# Patient Record
Sex: MALE | Race: WHITE | Employment: FULL TIME | URBAN - METROPOLITAN AREA
[De-identification: names, ages, dates, MRNs, and addresses within clinical notes are randomized per-mention and may not be internally consistent; named-entity substitution may affect disease eponyms.]

---

## 2019-03-06 ENCOUNTER — OFFICE VISIT (OUTPATIENT)
Dept: INTERNAL MEDICINE CLINIC | Age: 59
End: 2019-03-06

## 2019-03-06 ENCOUNTER — TELEPHONE (OUTPATIENT)
Dept: INTERNAL MEDICINE CLINIC | Age: 59
End: 2019-03-06

## 2019-03-06 VITALS
HEIGHT: 68 IN | DIASTOLIC BLOOD PRESSURE: 100 MMHG | SYSTOLIC BLOOD PRESSURE: 162 MMHG | RESPIRATION RATE: 12 BRPM | OXYGEN SATURATION: 98 % | BODY MASS INDEX: 36.07 KG/M2 | TEMPERATURE: 98.5 F | WEIGHT: 238 LBS | HEART RATE: 74 BPM

## 2019-03-06 DIAGNOSIS — N52.01 ERECTILE DYSFUNCTION DUE TO ARTERIAL INSUFFICIENCY: ICD-10-CM

## 2019-03-06 DIAGNOSIS — I10 PRIMARY HYPERTENSION: ICD-10-CM

## 2019-03-06 DIAGNOSIS — G47.33 OBSTRUCTIVE SLEEP APNEA: ICD-10-CM

## 2019-03-06 DIAGNOSIS — R63.5 WEIGHT GAIN: ICD-10-CM

## 2019-03-06 DIAGNOSIS — Z00.00 ROUTINE GENERAL MEDICAL EXAMINATION AT A HEALTH CARE FACILITY: Primary | ICD-10-CM

## 2019-03-06 DIAGNOSIS — E66.01 SEVERE OBESITY (HCC): ICD-10-CM

## 2019-03-06 DIAGNOSIS — F51.01 PRIMARY INSOMNIA: ICD-10-CM

## 2019-03-06 DIAGNOSIS — Z12.5 PROSTATE CANCER SCREENING: ICD-10-CM

## 2019-03-06 RX ORDER — PHENOL/SODIUM PHENOLATE
20 AEROSOL, SPRAY (ML) MUCOUS MEMBRANE DAILY
COMMUNITY
End: 2020-07-27 | Stop reason: DRUGHIGH

## 2019-03-06 RX ORDER — HYDROCHLOROTHIAZIDE 25 MG/1
25 TABLET ORAL DAILY
Qty: 90 TAB | Refills: 3 | Status: SHIPPED | OUTPATIENT
Start: 2019-03-06 | End: 2019-05-15 | Stop reason: SDUPTHER

## 2019-03-06 RX ORDER — IBUPROFEN 400 MG/1
TABLET ORAL
COMMUNITY
End: 2022-02-16

## 2019-03-06 NOTE — PROGRESS NOTES
Iraj Liao,born 1960, is a 64 y.o. male, who is seen today for new patient evaluation. He says his blood pressures been high for a couple of years but his doctor retired 2 years ago he has not seen a doctor since then. He also is obese and he does not think his weight is changed a whole lot may be down 5 pounds in the last year. He has arthritis and uses Motrin. He has obstructive sleep apnea and his mask is not working properly but he still uses it but would like to see . Had a colonoscopy 3-5 years ago which was normal but he cannot remember who did it or where it was done. He works 2 jobs and does not sleep as well as he should. He does take cat naps in between jobs. He sleeps perhaps 6 hours a day altogether. He has erectile dysfunction but does not have a relationship right now so he does not need Viagra right now. He thinks 1 of his testicles a little softer than the other and he would like that checked. He uses omeprazole and is having no reflux symptoms. Past Medical History:   Diagnosis Date    Basal cell carcinoma     being treated.  Gastrointestinal disorder     Hypertension     Sleep apnea      Past Surgical History:   Procedure Laterality Date    HX TYMPANOSTOMY       Current Outpatient Medications   Medication Sig Dispense Refill    Omeprazole delayed release (PRILOSEC D/R) 20 mg tablet Take 20 mg by mouth daily.  ibuprofen (MOTRIN) 400 mg tablet Take  by mouth every six (6) hours as needed for Pain. Visit Vitals  BP (!) 162/100 (BP 1 Location: Left arm, BP Patient Position: Sitting)   Pulse 74   Temp 98.5 °F (36.9 °C) (Oral)   Resp 12   Ht 5' 8\" (1.727 m)   Wt 238 lb (108 kg)   SpO2 98%   BMI 36.19 kg/m²     Carotids are 2+ without bruits. Lungs are clear to percussion. Good breath sounds with no wheezing or crackles. Heart reveals a regular rhythm with normal S1 and S2 no murmur gallop click or rub. Apical impulse is not palpable.   Abdomen is obese soft nontender with no obvious hepatosplenomegaly or masses and no bruits. Extremities reveal no clubbing cyanosis or edema. Pulses are 2+. He is uncircumcised, testicles feel normal.    Assessment: #1. Hypertension. We will start hydrochlorthiazide 25 mg daily and no added salt diet. #2.  Severe obesity, he is going to work much harder on weight loss going forward. #3.  Obstructive sleep apnea, will refer to sleep medicine at his request.  #4.  Erectile dysfunction, he does not need Viagra right now since he is not having a relationship right now. #5. Insomnia, working 2 jobs, but asked him to try to get as much sleep as he can preferably up to 7 hours/day even if it is broken up somewhat. #6.  Family history diabetes, he is hoping he is not diabetic. He has been told he is borderline in the past.  We will check fasting glucose soon. We will check fasting lab including lipids and chemistries and also check PSA in the very near future. Follow-up in about 2 months to see how he is doing his blood pressure and see how he is doing with weight control. Robb Oneal MD FACP    Please note: This document has been produced using voice recognition software. Unrecognized errors in transcription may be present.

## 2019-03-06 NOTE — TELEPHONE ENCOUNTER
Patient stating you were calling in BP meds but I don't see anything. He wants that send to [facility].

## 2019-03-09 LAB
A-G RATIO,AGRAT: 1.7 RATIO (ref 1.1–2.6)
ABSOLUTE LYMPHOCYTE COUNT, 10803: 2.5 K/UL (ref 1–4.8)
ALBUMIN SERPL-MCNC: 4.7 G/DL (ref 3.5–5)
ALP SERPL-CCNC: 79 U/L (ref 25–115)
ALT SERPL-CCNC: 24 U/L (ref 5–40)
ANION GAP SERPL CALC-SCNC: 13.9 MMOL/L
AST SERPL W P-5'-P-CCNC: 17 U/L (ref 10–37)
BASOPHILS # BLD: 0.1 K/UL (ref 0–0.2)
BASOPHILS NFR BLD: 0 % (ref 0–2)
BILIRUB SERPL-MCNC: 0.5 MG/DL (ref 0.2–1.2)
BUN SERPL-MCNC: 10 MG/DL (ref 6–22)
CALCIUM SERPL-MCNC: 9.6 MG/DL (ref 8.4–10.4)
CHLORIDE SERPL-SCNC: 104 MMOL/L (ref 98–110)
CHOLEST SERPL-MCNC: 209 MG/DL (ref 110–200)
CO2 SERPL-SCNC: 28 MMOL/L (ref 20–32)
CREAT SERPL-MCNC: 0.7 MG/DL (ref 0.5–1.2)
EOSINOPHIL # BLD: 0.3 K/UL (ref 0–0.5)
EOSINOPHIL NFR BLD: 3 % (ref 0–6)
ERYTHROCYTE [DISTWIDTH] IN BLOOD BY AUTOMATED COUNT: 15.6 % (ref 10–15.5)
GFRAA, 66117: >60
GFRNA, 66118: >60
GLOBULIN,GLOB: 2.8 G/DL (ref 2–4)
GLUCOSE SERPL-MCNC: 131 MG/DL (ref 70–99)
GRANULOCYTES,GRANS: 67 % (ref 40–75)
HCT VFR BLD AUTO: 50.4 % (ref 39.3–51.6)
HDLC SERPL-MCNC: 37 MG/DL (ref 40–59)
HDLC SERPL-MCNC: 5.6 MG/DL (ref 0–5)
HGB BLD-MCNC: 16.2 G/DL (ref 13.1–17.2)
LDLC SERPL CALC-MCNC: 145 MG/DL (ref 50–99)
LYMPHOCYTES, LYMLT: 23 % (ref 20–45)
MCH RBC QN AUTO: 31 PG (ref 26–34)
MCHC RBC AUTO-ENTMCNC: 32 G/DL (ref 31–36)
MCV RBC AUTO: 95 FL (ref 80–95)
MONOCYTES # BLD: 0.8 K/UL (ref 0.1–1)
MONOCYTES NFR BLD: 7 % (ref 3–12)
NEUTROPHILS # BLD AUTO: 7.5 K/UL (ref 1.8–7.7)
PLATELET # BLD AUTO: 184 K/UL (ref 140–440)
PMV BLD AUTO: 9.4 FL (ref 9–13)
POTASSIUM SERPL-SCNC: 4.3 MMOL/L (ref 3.5–5.5)
PROT SERPL-MCNC: 7.5 G/DL (ref 6.4–8.3)
PSA SERPL-MCNC: 0.66 NG/ML
RBC # BLD AUTO: 5.3 M/UL (ref 3.8–5.8)
SODIUM SERPL-SCNC: 146 MMOL/L (ref 133–145)
T4 FREE SERPL-MCNC: 1.2 NG/DL (ref 0.9–1.8)
TRIGL SERPL-MCNC: 134 MG/DL (ref 40–149)
TSH SERPL DL<=0.005 MIU/L-ACNC: 0.86 MCU/ML (ref 0.27–4.2)
VLDLC SERPL CALC-MCNC: 27 MG/DL (ref 8–30)
WBC # BLD AUTO: 11.2 K/UL (ref 4–11)

## 2019-03-11 ENCOUNTER — TELEPHONE (OUTPATIENT)
Dept: INTERNAL MEDICINE CLINIC | Age: 59
End: 2019-03-11

## 2019-03-11 NOTE — TELEPHONE ENCOUNTER
----- Message from Kira Rojas MD sent at 3/11/2019 10:04 AM EDT -----  Please notify the patient that his lab work looks good.

## 2019-05-10 ENCOUNTER — OFFICE VISIT (OUTPATIENT)
Dept: INTERNAL MEDICINE CLINIC | Age: 59
End: 2019-05-10

## 2019-05-10 VITALS
HEART RATE: 81 BPM | OXYGEN SATURATION: 98 % | WEIGHT: 224.2 LBS | TEMPERATURE: 98.1 F | DIASTOLIC BLOOD PRESSURE: 76 MMHG | HEIGHT: 68 IN | BODY MASS INDEX: 33.98 KG/M2 | SYSTOLIC BLOOD PRESSURE: 138 MMHG | RESPIRATION RATE: 18 BRPM

## 2019-05-10 DIAGNOSIS — G47.33 OBSTRUCTIVE SLEEP APNEA: ICD-10-CM

## 2019-05-10 DIAGNOSIS — I10 PRIMARY HYPERTENSION: Primary | ICD-10-CM

## 2019-05-10 DIAGNOSIS — E66.9 OBESITY (BMI 30-39.9): ICD-10-CM

## 2019-05-10 DIAGNOSIS — R73.01 IMPAIRED FASTING GLUCOSE: ICD-10-CM

## 2019-05-10 PROBLEM — E66.01 SEVERE OBESITY (HCC): Status: RESOLVED | Noted: 2019-03-06 | Resolved: 2019-05-10

## 2019-05-10 NOTE — PATIENT INSTRUCTIONS

## 2019-05-10 NOTE — PROGRESS NOTES
1. Have you been to the ER, urgent care clinic or hospitalized since your last visit? NO.     2. Have you seen or consulted any other health care providers outside of the [address] [place] [address] since your last visit (Include any pap smears or colon screening)? YES      Do you have an Advanced Directive? NO    Would you like information on Advanced Directives?  NO

## 2019-05-10 NOTE — PROGRESS NOTES
Rich Liao,born 1960, is a 64 y.o. male, who is seen today for reevaluation of hypertension severe obesity sleep apnea GERD DJD. He is feeling well he has some surgery for skin cancer in the nose and it turned out to be fairly extensive, a plastic surgeon worked on him and he will be having a little more soon. He has bandaged. Is been working on his diet and has lost 14 pounds in 2 months been on hydrochlorothiazide for 2 months with no side effects. DJD is doing well. Uses CPAP. He is having no reflux as he continues omeprazole. Past Medical History:   Diagnosis Date    Basal cell carcinoma     being treated.  Gastrointestinal disorder     GERD (gastroesophageal reflux disease)     Hypertension     Sleep apnea      Current Outpatient Medications   Medication Sig Dispense Refill    Omeprazole delayed release (PRILOSEC D/R) 20 mg tablet Take 20 mg by mouth daily.  ibuprofen (MOTRIN) 400 mg tablet Take  by mouth every six (6) hours as needed for Pain.  hydroCHLOROthiazide (HYDRODIURIL) 25 mg tablet Take 1 Tab by mouth daily. 90 Tab 3     Visit Vitals  /76 (BP 1 Location: Left arm, BP Patient Position: Sitting)   Pulse 81   Temp 98.1 °F (36.7 °C) (Oral)   Resp 18   Ht 5' 8\" (1.727 m)   Wt 224 lb 3.2 oz (101.7 kg)   SpO2 98%   BMI 34.09 kg/m²     Visit Vitals  /76 (BP 1 Location: Left arm, BP Patient Position: Sitting)   Pulse 81   Temp 98.1 °F (36.7 °C) (Oral)   Resp 18   Ht 5' 8\" (1.727 m)   Wt 224 lb 3.2 oz (101.7 kg)   SpO2 98%   BMI 34.09 kg/m²     Assessment: #1. Hypertension now adequately controlled. He will continue hydrochlorthiazide 25 mg daily and no added salt diet. #2.  Obesity down 14 pounds, he is now obese but not severely obese anymore. He will work to continue losing weight, he is pretty sure he can do that. #3.  Recent plastic surgery for skin cancer on the nose, follow-up with his plastic surgeon, Dr. Toni Franklin.   #4.  GERD asymptomatic, he will continue omeprazole 20 mg daily. #5.  Obstructive sleep apnea doing well, he will continue CPAP. #5.  Impaired fasting glucose, I told him that if he had another glucose this high he would officially be diabetic but so far he has not and since he is losing weight rapidly we will check glucose again until early next year. #6. He was told to use vitamin D supplements every day, he did buy that but that has not used any, recommended by his prior physician. I recommended that he not bother with vitamin D, he gets enough sunlight he is middle-aged male is at low risk for complications of low vitamin D if he should have it. Follow-up 4 months, no lab for nearly a year. This visit lasted 25 minutes, greater than 50% of the time spent counseling. Robb Lujan MD FACP    Please note: This document has been produced using voice recognition software. Unrecognized errors in transcription may be present.

## 2019-05-15 RX ORDER — HYDROCHLOROTHIAZIDE 25 MG/1
25 TABLET ORAL DAILY
Qty: 90 TAB | Refills: 3 | Status: SHIPPED | OUTPATIENT
Start: 2019-05-15 | End: 2020-06-21

## 2019-05-21 RX ORDER — SILDENAFIL 50 MG/1
50 TABLET, FILM COATED ORAL
COMMUNITY
Start: 2010-11-29 | End: 2019-09-13 | Stop reason: DRUGHIGH

## 2019-05-21 RX ORDER — MOMETASONE FUROATE 50 UG/1
SPRAY, METERED NASAL
Refills: 11 | COMMUNITY
Start: 2019-03-25 | End: 2021-09-03

## 2019-05-22 ENCOUNTER — ANESTHESIA EVENT (OUTPATIENT)
Dept: SURGERY | Age: 59
End: 2019-05-22
Payer: COMMERCIAL

## 2019-05-23 ENCOUNTER — ANESTHESIA (OUTPATIENT)
Dept: SURGERY | Age: 59
End: 2019-05-23
Payer: COMMERCIAL

## 2019-05-23 ENCOUNTER — HOSPITAL ENCOUNTER (OUTPATIENT)
Age: 59
Setting detail: OUTPATIENT SURGERY
Discharge: HOME | End: 2019-05-23
Attending: OTOLARYNGOLOGY | Admitting: OTOLARYNGOLOGY
Payer: COMMERCIAL

## 2019-05-23 VITALS
TEMPERATURE: 98 F | RESPIRATION RATE: 17 BRPM | SYSTOLIC BLOOD PRESSURE: 138 MMHG | OXYGEN SATURATION: 99 % | DIASTOLIC BLOOD PRESSURE: 80 MMHG | HEART RATE: 63 BPM | WEIGHT: 222 LBS | HEIGHT: 68 IN | BODY MASS INDEX: 33.65 KG/M2

## 2019-05-23 LAB
ATRIAL RATE: 61 BPM
BUN BLD-MCNC: 14 MG/DL (ref 7–18)
CALCULATED P AXIS, ECG09: 40 DEGREES
CALCULATED R AXIS, ECG10: 20 DEGREES
CALCULATED T AXIS, ECG11: 43 DEGREES
CHLORIDE BLD-SCNC: 101 MMOL/L (ref 100–108)
DIAGNOSIS, 93000: NORMAL
GLUCOSE BLD STRIP.AUTO-MCNC: 125 MG/DL (ref 74–106)
HCT VFR BLD CALC: 39 % (ref 36–49)
HGB BLD-MCNC: 13.3 G/DL (ref 12–16)
P-R INTERVAL, ECG05: 168 MS
POTASSIUM BLD-SCNC: 3.4 MMOL/L (ref 3.5–5.5)
Q-T INTERVAL, ECG07: 434 MS
QRS DURATION, ECG06: 90 MS
QTC CALCULATION (BEZET), ECG08: 436 MS
SODIUM BLD-SCNC: 142 MMOL/L (ref 136–145)
VENTRICULAR RATE, ECG03: 61 BPM

## 2019-05-23 PROCEDURE — 77030002986 HC SUT PROL J&J -A: Performed by: OTOLARYNGOLOGY

## 2019-05-23 PROCEDURE — 76010000149 HC OR TIME 1 TO 1.5 HR: Performed by: OTOLARYNGOLOGY

## 2019-05-23 PROCEDURE — 74011000272 HC RX REV CODE- 272: Performed by: OTOLARYNGOLOGY

## 2019-05-23 PROCEDURE — 77030018846 HC SOL IRR STRL H20 ICUM -A: Performed by: OTOLARYNGOLOGY

## 2019-05-23 PROCEDURE — 74011250637 HC RX REV CODE- 250/637: Performed by: NURSE ANESTHETIST, CERTIFIED REGISTERED

## 2019-05-23 PROCEDURE — 74011250636 HC RX REV CODE- 250/636: Performed by: OTOLARYNGOLOGY

## 2019-05-23 PROCEDURE — 77030020263 HC SOL INJ SOD CL0.9% LFCR 1000ML: Performed by: OTOLARYNGOLOGY

## 2019-05-23 PROCEDURE — 74011250636 HC RX REV CODE- 250/636

## 2019-05-23 PROCEDURE — 93005 ELECTROCARDIOGRAM TRACING: CPT

## 2019-05-23 PROCEDURE — 76210000021 HC REC RM PH II 0.5 TO 1 HR: Performed by: OTOLARYNGOLOGY

## 2019-05-23 PROCEDURE — 74011000250 HC RX REV CODE- 250: Performed by: OTOLARYNGOLOGY

## 2019-05-23 PROCEDURE — 74011250636 HC RX REV CODE- 250/636: Performed by: NURSE ANESTHETIST, CERTIFIED REGISTERED

## 2019-05-23 PROCEDURE — 77030002933 HC SUT MCRYL J&J -A: Performed by: OTOLARYNGOLOGY

## 2019-05-23 PROCEDURE — 76060000033 HC ANESTHESIA 1 TO 1.5 HR: Performed by: OTOLARYNGOLOGY

## 2019-05-23 PROCEDURE — 77030018836 HC SOL IRR NACL ICUM -A: Performed by: OTOLARYNGOLOGY

## 2019-05-23 PROCEDURE — 77030031139 HC SUT VCRL2 J&J -A: Performed by: OTOLARYNGOLOGY

## 2019-05-23 PROCEDURE — 82947 ASSAY GLUCOSE BLOOD QUANT: CPT

## 2019-05-23 RX ORDER — LIDOCAINE HYDROCHLORIDE 20 MG/ML
INJECTION, SOLUTION EPIDURAL; INFILTRATION; INTRACAUDAL; PERINEURAL AS NEEDED
Status: DISCONTINUED | OUTPATIENT
Start: 2019-05-23 | End: 2019-05-23 | Stop reason: HOSPADM

## 2019-05-23 RX ORDER — CEPHALEXIN 250 MG/1
500 CAPSULE ORAL 4 TIMES DAILY
Qty: 28 CAP | Refills: 0 | Status: SHIPPED | OUTPATIENT
Start: 2019-05-23 | End: 2020-07-27 | Stop reason: ALTCHOICE

## 2019-05-23 RX ORDER — SODIUM CHLORIDE, SODIUM LACTATE, POTASSIUM CHLORIDE, CALCIUM CHLORIDE 600; 310; 30; 20 MG/100ML; MG/100ML; MG/100ML; MG/100ML
25 INJECTION, SOLUTION INTRAVENOUS CONTINUOUS
Status: DISCONTINUED | OUTPATIENT
Start: 2019-05-23 | End: 2019-05-23 | Stop reason: HOSPADM

## 2019-05-23 RX ORDER — BACITRACIN 500 [USP'U]/G
OINTMENT TOPICAL AS NEEDED
Status: DISCONTINUED | OUTPATIENT
Start: 2019-05-23 | End: 2019-05-23 | Stop reason: HOSPADM

## 2019-05-23 RX ORDER — MIDAZOLAM HYDROCHLORIDE 1 MG/ML
INJECTION, SOLUTION INTRAMUSCULAR; INTRAVENOUS AS NEEDED
Status: DISCONTINUED | OUTPATIENT
Start: 2019-05-23 | End: 2019-05-23 | Stop reason: HOSPADM

## 2019-05-23 RX ORDER — FAMOTIDINE 20 MG/1
20 TABLET, FILM COATED ORAL ONCE
Status: COMPLETED | OUTPATIENT
Start: 2019-05-23 | End: 2019-05-23

## 2019-05-23 RX ORDER — PROPOFOL 10 MG/ML
INJECTION, EMULSION INTRAVENOUS AS NEEDED
Status: DISCONTINUED | OUTPATIENT
Start: 2019-05-23 | End: 2019-05-23 | Stop reason: HOSPADM

## 2019-05-23 RX ORDER — CEFAZOLIN SODIUM 2 G/50ML
2 SOLUTION INTRAVENOUS
Status: COMPLETED | OUTPATIENT
Start: 2019-05-23 | End: 2019-05-23

## 2019-05-23 RX ORDER — LIDOCAINE HYDROCHLORIDE AND EPINEPHRINE 10; 10 MG/ML; UG/ML
INJECTION, SOLUTION INFILTRATION; PERINEURAL AS NEEDED
Status: DISCONTINUED | OUTPATIENT
Start: 2019-05-23 | End: 2019-05-23 | Stop reason: HOSPADM

## 2019-05-23 RX ORDER — FENTANYL CITRATE 50 UG/ML
INJECTION, SOLUTION INTRAMUSCULAR; INTRAVENOUS AS NEEDED
Status: DISCONTINUED | OUTPATIENT
Start: 2019-05-23 | End: 2019-05-23 | Stop reason: HOSPADM

## 2019-05-23 RX ADMIN — CEFAZOLIN SODIUM 2 G: 2 SOLUTION INTRAVENOUS at 08:38

## 2019-05-23 RX ADMIN — LIDOCAINE HYDROCHLORIDE 20 MG: 20 INJECTION, SOLUTION EPIDURAL; INFILTRATION; INTRACAUDAL; PERINEURAL at 08:36

## 2019-05-23 RX ADMIN — MIDAZOLAM HYDROCHLORIDE 2 MG: 1 INJECTION, SOLUTION INTRAMUSCULAR; INTRAVENOUS at 08:25

## 2019-05-23 RX ADMIN — FENTANYL CITRATE 50 MCG: 50 INJECTION, SOLUTION INTRAMUSCULAR; INTRAVENOUS at 08:36

## 2019-05-23 RX ADMIN — FENTANYL CITRATE 50 MCG: 50 INJECTION, SOLUTION INTRAMUSCULAR; INTRAVENOUS at 08:52

## 2019-05-23 RX ADMIN — FAMOTIDINE 20 MG: 20 TABLET ORAL at 06:48

## 2019-05-23 RX ADMIN — SODIUM CHLORIDE, SODIUM LACTATE, POTASSIUM CHLORIDE, AND CALCIUM CHLORIDE: 600; 310; 30; 20 INJECTION, SOLUTION INTRAVENOUS at 08:29

## 2019-05-23 RX ADMIN — PROPOFOL 40 MG: 10 INJECTION, EMULSION INTRAVENOUS at 08:36

## 2019-05-23 NOTE — H&P
History and Physical    Patient: Wild Sommer MRN: [id number]  SSN: [id number]    YOB: 1960  Age: 64 y.o. Sex: male      Subjective:      Wild Sommer is a 64 y.o. male who underwent left paramedian forehead flap reconstruction right nasal ala Mohs  defect 4/26/19. Presents now for release of flap pedicle. .     Past Medical History:   Diagnosis Date    Basal cell carcinoma     being treated.  Gastrointestinal disorder     GERD (gastroesophageal reflux disease)     Hypertension     Sleep apnea     uses CPAP     Past Surgical History:   Procedure Laterality Date    HX HEENT      Ear Tubes    HX LYMPHADENECTOMY      left axilla    HX MOHS PROCEDURES      nose    HX OTHER SURGICAL      Melanoma removed on back       History reviewed. No pertinent family history. Social History     Tobacco Use    Smoking status: Never Smoker    Smokeless tobacco: Never Used   Substance Use Topics    Alcohol use: Yes     Comment: occasional      Prior to Admission medications    Medication Sig Start Date End Date Taking? Authorizing Provider   mometasone (NASONEX) 50 mcg/actuation nasal spray SHAKE LQ AND U 2 SPRAYS IEN QD 3/25/19  Yes Provider, Historical   hydroCHLOROthiazide (HYDRODIURIL) 25 mg tablet Take 1 Tab by mouth daily. 5/15/19  Yes Rudy Rodrigues MD   sildenafil citrate (VIAGRA) 50 mg tablet Take 50 mg by mouth. 11/29/10   Provider, Historical   Omeprazole delayed release (PRILOSEC D/R) 20 mg tablet Take 20 mg by mouth daily. Provider, Historical   ibuprofen (MOTRIN) 400 mg tablet Take  by mouth every six (6) hours as needed for Pain.     Provider, Historical        No Known Allergies        Objective:     Vitals:    05/21/19 1540 05/23/19 0631   BP:  136/67   Pulse:  64   Resp:  18   Temp:  98.6 °F (37 °C)   SpO2:  100%   Weight: 99.8 kg (220 lb) 100.7 kg (222 lb)   Height: 5' 8\" (1.727 m) 5' 8\" (1.727 m)        Physical Exam:  Nicely healed forehead  Flap  RRR  Lungs clear    Assessment:   S/p forehead flap recon right nasal ala  Hospital Problems  Date Reviewed: 5/10/2019    None          Plan:     Release of pedicle    Signed By: Maxim Domingo MD     May 23, 2019

## 2019-05-23 NOTE — DISCHARGE INSTRUCTIONS
Clean suture lines on nose and brow with 1/2 strength peroxide 3 times per day, then apply Bacitracin ointment each time    Apply OTC vitamin E oil to forehead scar 2 times per day with massage    Elevate head of bed for 4 nights on extra pillow    Okay to shower      DISCHARGE SUMMARY from Nurse    PATIENT INSTRUCTIONS:    After general anesthesia or intravenous sedation, for 24 hours or while taking prescription Narcotics:  · Limit your activities  · Do not drive and operate hazardous machinery  · Do not make important personal or business decisions  · Do  not drink alcoholic beverages  · If you have not urinated within 8 hours after discharge, please contact your surgeon on call. Report the following to your surgeon:  · Excessive pain, swelling, redness or odor of or around the surgical area  · Temperature over 100.5  · Nausea and vomiting lasting longer than 4 hours or if unable to take medications  · Any signs of decreased circulation or nerve impairment to extremity: change in color, persistent  numbness, tingling, coldness or increase pain  · Any questions    What to do at Home:  These are general instructions for a healthy lifestyle:    No smoking/ No tobacco products/ Avoid exposure to second hand smoke  Surgeon General's Warning:  Quitting smoking now greatly reduces serious risk to your health. Obesity, smoking, and sedentary lifestyle greatly increases your risk for illness    A healthy diet, regular physical exercise & weight monitoring are important for maintaining a healthy lifestyle    You may be retaining fluid if you have a history of heart failure or if you experience any of the following symptoms:  Weight gain of 3 pounds or more overnight or 5 pounds in a week, increased swelling in our hands or feet or shortness of breath while lying flat in bed. Please call your doctor as soon as you notice any of these symptoms; do not wait until your next office visit.     Recognize signs and symptoms of STROKE:    F-face looks uneven    A-arms unable to move or move unevenly    S-speech slurred or non-existent    T-time-call 911 as soon as signs and symptoms begin-DO NOT go       Back to bed or wait to see if you get better-TIME IS BRAIN. Warning Signs of HEART ATTACK     Call 911 if you have these symptoms:   Chest discomfort. Most heart attacks involve discomfort in the center of the chest that lasts more than a few minutes, or that goes away and comes back. It can feel like uncomfortable pressure, squeezing, fullness, or pain.  Discomfort in other areas of the upper body. Symptoms can include pain or discomfort in one or both arms, the back, neck, jaw, or stomach.  Shortness of breath with or without chest discomfort.  Other signs may include breaking out in a cold sweat, nausea, or lightheadedness. Don't wait more than five minutes to call 911 - MINUTES MATTER! Fast action can save your life. Calling 911 is almost always the fastest way to get lifesaving treatment. Emergency Medical Services staff can begin treatment when they arrive -- up to an hour sooner than if someone gets to the hospital by car. The discharge information has been reviewed with the patient. The patient verbalized understanding. Discharge medications reviewed with the patient and appropriate educational materials and side effects teaching were provided. ___________________________________________________________________________________________________________________________________  Patient armband removed and given to patient to take home.   Patient was informed of the privacy risks if armband lost or stolen

## 2019-05-23 NOTE — PROGRESS NOTES
conducted a pre-surgery visit with Candance Salinas, who is a 64 y. o.,male. The  provided the following Interventions:  Initiated a relationship of care and support. Offered prayer and assurance of continued prayers on patient's behalf. Plan:  Chaplains will continue to follow and will provide pastoral care on an as needed/requested basis.  recommends bedside caregivers page  on duty if patient shows signs of acute spiritual or emotional distress.     [facility]   Staff [address] [facility]   [phone]

## 2019-05-23 NOTE — OP NOTES
[facility]  OPERATIVE REPORT    Name:  Gisela Rainey  MR#:   [id number]  :  1960  ACCOUNT #:  [de-identified]  DATE OF SERVICE:  2019      PREOPERATIVE DIAGNOSES:  Status post forehead flap reconstruction, large right nasal ala/nasal alar groove defect status post Mohs excision basal cell carcinoma, as above. POSTOPERATIVE DIAGNOSIS:  Status post forehead flap reconstruction, large right nasal ala/nasal alar groove defect status post Mohs excision basal cell carcinoma, as above. PROCEDURE PERFORMED:  Release of forehead flap pedicle with adjacent tissue transfer (CPT 57002). SURGEON:  Josh Nath MD    ASSISTANT:  None. ANESTHESIA:  Local, sedation. COMPLICATIONS:  None. SPECIMENS REMOVED:  None    IMPLANTS:  None. ESTIMATED BLOOD LOSS:  Minimal.    BRIEF HISTORY:  The patient is a 82-year-old male with a long history of sun exposure in the past, who is recently diagnosed with a large basal cell carcinoma of the right nasal ala. He underwent an extensive Mohs resection per Dr Guerrero Late on 2019, and was left with a large defect with the right nasal ala and adjacent alar-facial groove. He was re-taken to the operating room on 2019 for reconstruction with a left paramedian myocutaneous forehead flap. The flap has healed nicely; he presents now for release of his forehead flap pedicle with adjacent tissue transfer. PROCEDURE:  The patient was placed on the operating table in the supine position and was given intravenous sedation, the operative sites were injected with 1% lidocaine with 1:100,000 epinephrine. The patient was then prepped and draped in the usual sterile fashion, and was given Ancef 2 g IV.     After allowing adequate time for vasoconstriction, #15 blade was used to amputate the proximal portion of the pedicle at the left medial brow and the distal portion of the pedicle just superior to the inside end of the nasal defect - the pedicle was discarded. Next, #15 blade was used to excise excess skin and subcutaneous tissue from the left medial brow donor site. Hemostasis was achieved with bipolar electrocautery. The left medial brow donor site was then meticulously closed in a curvilinear complex plastic fashion - 4-0 Monocryl was used in an interrupted inverted fashion to reapproximate the subcutaneous tissue, and 6-0 Prolene was used in a running locked fashion to reapproximate the skin edges. Redundant soft tissue at the inferior aspect of the closure was excised with a #15 blade prior to suturing. Next, the superior half of the nasal ala/alar-facial groove flap was elevated with a #15 blade. Excess subcutaneous soft tissue was meticulously debrided from the undersurface of the flap. Care was taken to maintain sufficient soft tissue on the flap to maintain viability. Next, #15 blade was used to excise a strip of soft tissue at the superior aspect of the defect. Additional soft tissue was excised from the bed of the defect to better accommodate the flap. Hemostasis was achieved with bipolar electrocautery. Next, the flap was re-draped over the defect, and excess skin and subcutaneous tissue was excised from the superior edge of the flap. The flap was then transferred into the defect and was meticulously sutured in place in layers with 4-0 Monocryl and 6-0 Prolene. The flap laid in beautifully. The suture lines were clean, bacitracin ointment was applied. The procedure was terminated - the patient was taken to the recovery room in stable condition. He tolerated the procedure well, there were no complications.   Estimated blood loss was minimal.        Fabian Marino MD      [id number]  D:  05/23/2019 9:50  T:  05/23/2019 13:30  JOB #:  [id number]  CC:  Ronn Goodell, MD Sandria Curling, MD

## 2019-05-23 NOTE — ANESTHESIA POSTPROCEDURE EVALUATION
Procedure(s):  Release of Forehead flap pedicle.     general    Anesthesia Post Evaluation      Multimodal analgesia: multimodal analgesia used between 6 hours prior to anesthesia start to PACU discharge  Patient location during evaluation: bedside  Patient participation: complete - patient participated  Level of consciousness: awake  Pain management: adequate  Airway patency: patent  Anesthetic complications: no  Cardiovascular status: acceptable  Respiratory status: acceptable  Hydration status: acceptable  Post anesthesia nausea and vomiting:  controlled      Vitals Value Taken Time   /80 5/23/2019  9:39 AM   Temp 36.7 °C (98 °F) 5/23/2019  9:38 AM   Pulse 63 5/23/2019  9:39 AM   Resp 17 5/23/2019  9:38 AM   SpO2 99 % 5/23/2019  9:39 AM

## 2019-05-23 NOTE — PERIOP NOTES
Pre-Op Summary    Pt arrived via car with family/friend and is oriented to time, place, person and situation. Patient with steady gait with none assistive devices. Visit Vitals  /67   Pulse 64   Temp 98.6 °F (37 °C)   Resp 18   Ht 5' 8\" (1.727 m)   Wt 100.7 kg (222 lb)   SpO2 100%   BMI 33.75 kg/m²       Peripheral IV located on Left hand . Patients belongings are located in [facility]. Patient's point of contact is friend Pat Lloyd and their contact number is: [phone]. They will be leaving and coming back. They are able to receive medication information. They will be their ride home.

## 2019-05-23 NOTE — BRIEF OP NOTE
BRIEF OPERATIVE NOTE    Date of Procedure: 5/23/2019   Preoperative Diagnosis: C44.3111 Basal Cell Carcinoma of nose, s/p forehead flap reconstruction  Postoperative Diagnosis: Same  Procedure(s):  Release of Forehead flap pedicle with adjacent tissue transfer  Surgeon(s) and Role:     * Tricia Lock MD - Primary         Surgical Assistant: None    Surgical Staff:  Circ-1: Ce aBngura RN  Scrub Tech-1: Shivam Lin  Surg Asst-1: Hernando Rush  Event Time In Time Out   Incision Start 3791    Incision Close 0926      Anesthesia: MAC   Estimated Blood Loss: Minimal  Specimens: None   Findings: Nicely healed flap   Complications: None  Implants: None

## 2019-05-23 NOTE — ANESTHESIA PREPROCEDURE EVALUATION
Relevant Problems   No relevant active problems       Anesthetic History   No history of anesthetic complications            Review of Systems / Medical History  Patient summary reviewed, nursing notes reviewed and pertinent labs reviewed    Pulmonary  Within defined limits      Sleep apnea           Neuro/Psych   Within defined limits           Cardiovascular    Hypertension              Exercise tolerance: >4 METS  Comments: Stress test noted (2006)   GI/Hepatic/Renal     GERD           Endo/Other        Morbid obesity     Other Findings   Comments: Basal cell carcinoma          Physical Exam    Airway  Mallampati: III  TM Distance: < 4 cm  Neck ROM: normal range of motion   Mouth opening: Normal     Cardiovascular    Rhythm: regular  Rate: normal         Dental    Dentition: Poor dentition     Pulmonary  Breath sounds clear to auscultation               Abdominal  GI exam deferred       Other Findings            Anesthetic Plan    ASA: 3  Anesthesia type: MAC and general - backup          Induction: Intravenous  Anesthetic plan and risks discussed with: Patient

## 2019-09-13 ENCOUNTER — OFFICE VISIT (OUTPATIENT)
Dept: INTERNAL MEDICINE CLINIC | Age: 59
End: 2019-09-13

## 2019-09-13 VITALS
BODY MASS INDEX: 35.01 KG/M2 | OXYGEN SATURATION: 96 % | SYSTOLIC BLOOD PRESSURE: 130 MMHG | HEIGHT: 68 IN | DIASTOLIC BLOOD PRESSURE: 76 MMHG | RESPIRATION RATE: 14 BRPM | TEMPERATURE: 98.9 F | HEART RATE: 73 BPM | WEIGHT: 231 LBS

## 2019-09-13 DIAGNOSIS — I10 PRIMARY HYPERTENSION: Primary | ICD-10-CM

## 2019-09-13 DIAGNOSIS — R73.01 IMPAIRED FASTING GLUCOSE: ICD-10-CM

## 2019-09-13 DIAGNOSIS — E66.9 OBESITY (BMI 30-39.9): ICD-10-CM

## 2019-09-13 DIAGNOSIS — Z00.00 ROUTINE GENERAL MEDICAL EXAMINATION AT A HEALTH CARE FACILITY: ICD-10-CM

## 2019-09-13 DIAGNOSIS — N52.01 ERECTILE DYSFUNCTION DUE TO ARTERIAL INSUFFICIENCY: ICD-10-CM

## 2019-09-13 RX ORDER — SILDENAFIL 100 MG/1
TABLET, FILM COATED ORAL
Qty: 5 TAB | Refills: 11 | Status: SHIPPED | OUTPATIENT
Start: 2019-09-13 | End: 2019-12-06 | Stop reason: SDUPTHER

## 2019-09-13 NOTE — PROGRESS NOTES
Sarai Liao,born 1960, is a 64 y.o. male, who is seen today for reevaluation of hypertension obesity impaired fasting glucose. He has been on and off his diet but overall not losing weight over the last several months. He feels well. He sometimes forgets to take hydrochlorthiazide. He notes that he is not in a relationship but he has erectile dysfunction and he is to be on Viagra without benefit, samples, possibly 50 mg. He would like to try something to help him in that way. Past Medical History:   Diagnosis Date    Basal cell carcinoma     being treated.  Gastrointestinal disorder     GERD (gastroesophageal reflux disease)     Hypertension     Sleep apnea     uses CPAP     Current Outpatient Medications   Medication Sig Dispense Refill    sildenafil citrate (VIAGRA) 100 mg tablet 1 tablet by mouth at least 1 hour before intercourse 5 Tab 11    mometasone (NASONEX) 50 mcg/actuation nasal spray SHAKE LQ AND U 2 SPRAYS IEN QD  11    hydroCHLOROthiazide (HYDRODIURIL) 25 mg tablet Take 1 Tab by mouth daily. 90 Tab 3    Omeprazole delayed release (PRILOSEC D/R) 20 mg tablet Take 20 mg by mouth daily.  ibuprofen (MOTRIN) 400 mg tablet Take  by mouth every six (6) hours as needed for Pain.  cephALEXin (KEFLEX) 250 mg capsule Take 2 Caps by mouth four (4) times daily. Indications: S/p flap surgery 28 Cap 0     Visit Vitals  /76   Pulse 73   Temp 98.9 °F (37.2 °C) (Oral)   Resp 14   Ht 5' 8\" (1.727 m)   Wt 231 lb (104.8 kg)   SpO2 96%   BMI 35.12 kg/m²     Carotids are 2+ without bruits. Lungs are clear to percussion. Good breath sounds with no wheezing or crackles. Heart reveals a regular rhythm with normal S1 and S2 no murmur gallop click or rub. Apical impulse is not palpable. Abdomen is soft and nontender with no hepatospleno megaly or masses and no bruits. Extremities reveal no clubbing cyanosis or edema. Assessment: #1.   Hypertension controlled, he will continue Hydrocort thiazide 25 mg daily. #2.  Obesity, I told him he must try to lose some weight, is borderline for diabetes and may actually be diabetic, we will check glucose and A1c in 6 months. #3.  Erectile dysfunction, 100 mg of Viagra to be used at least 1 hour before intercourse. Follow-up 6 months for physical    Cljoann Williamson. Jennifer Sheffield MD FACP    Please note: This document has been produced using voice recognition software. Unrecognized errors in transcription may be present.

## 2019-09-13 NOTE — PROGRESS NOTES
Ron Giraldo presents today for   Chief Complaint   Patient presents with    Hypertension     4 month follow up                              room 10              Depression Screening:  3 most recent PHQ Screens 3/6/2019   Little interest or pleasure in doing things Several days   Feeling down, depressed, irritable, or hopeless Several days   Total Score PHQ 2 2       Learning Assessment:  No flowsheet data found. Abuse Screening:  No flowsheet data found. Fall Risk  No flowsheet data found. Coordination of Care:  1. Have you been to the ER, urgent care clinic since your last visit? Hospitalized since your last visit? no    2. Have you seen or consulted any other health care providers outside of the [address] [place] [address] since your last visit? Include any pap smears or colon screening.  no

## 2019-12-06 RX ORDER — SILDENAFIL 100 MG/1
TABLET, FILM COATED ORAL
Qty: 5 TAB | Refills: 11 | Status: SHIPPED | OUTPATIENT
Start: 2019-12-06 | End: 2020-07-27 | Stop reason: SDUPTHER

## 2019-12-06 NOTE — TELEPHONE ENCOUNTER
Last Visit: 9/13/19 with MD Isabel Adame  Next Appointment: 3/20/20 with MD Isabel Adame    Requested Prescriptions     Pending Prescriptions Disp Refills    sildenafil citrate (VIAGRA) 100 mg tablet 5 Tab 11     Sig: Take 1 tablet by mouth at least 1 hour before intercourse

## 2020-03-18 ENCOUNTER — TELEPHONE (OUTPATIENT)
Dept: INTERNAL MEDICINE CLINIC | Age: 60
End: 2020-03-18

## 2020-07-16 ENCOUNTER — TELEPHONE (OUTPATIENT)
Dept: INTERNAL MEDICINE CLINIC | Age: 60
End: 2020-07-16

## 2020-07-16 DIAGNOSIS — R63.5 WEIGHT GAIN: ICD-10-CM

## 2020-07-16 DIAGNOSIS — R73.01 IMPAIRED FASTING GLUCOSE: ICD-10-CM

## 2020-07-16 DIAGNOSIS — Z12.5 PROSTATE CANCER SCREENING: ICD-10-CM

## 2020-07-16 DIAGNOSIS — Z00.00 ROUTINE GENERAL MEDICAL EXAMINATION AT A HEALTH CARE FACILITY: ICD-10-CM

## 2020-07-16 DIAGNOSIS — I10 PRIMARY HYPERTENSION: Primary | ICD-10-CM

## 2020-07-17 ENCOUNTER — HOSPITAL ENCOUNTER (OUTPATIENT)
Dept: LAB | Age: 60
Discharge: HOME | End: 2020-07-17
Payer: COMMERCIAL

## 2020-07-17 ENCOUNTER — APPOINTMENT (OUTPATIENT)
Dept: INTERNAL MEDICINE CLINIC | Age: 60
End: 2020-07-17

## 2020-07-17 DIAGNOSIS — I10 PRIMARY HYPERTENSION: ICD-10-CM

## 2020-07-17 DIAGNOSIS — Z00.00 ROUTINE GENERAL MEDICAL EXAMINATION AT A HEALTH CARE FACILITY: ICD-10-CM

## 2020-07-17 DIAGNOSIS — R63.5 WEIGHT GAIN: ICD-10-CM

## 2020-07-17 DIAGNOSIS — R73.01 IMPAIRED FASTING GLUCOSE: ICD-10-CM

## 2020-07-17 DIAGNOSIS — Z12.5 PROSTATE CANCER SCREENING: ICD-10-CM

## 2020-07-17 LAB
ALBUMIN SERPL-MCNC: 4.5 G/DL (ref 3.4–5)
ALBUMIN/GLOB SERPL: 1.5 {RATIO} (ref 0.8–1.7)
ALP SERPL-CCNC: 61 U/L (ref 45–117)
ALT SERPL-CCNC: 24 U/L (ref 16–61)
ANION GAP SERPL CALC-SCNC: 9 MMOL/L (ref 3–18)
AST SERPL-CCNC: 11 U/L (ref 10–38)
BASOPHILS # BLD: 0 K/UL (ref 0–0.1)
BASOPHILS NFR BLD: 0 % (ref 0–2)
BILIRUB SERPL-MCNC: 0.6 MG/DL (ref 0.2–1)
BUN SERPL-MCNC: 12 MG/DL (ref 7–18)
BUN/CREAT SERPL: 15 (ref 12–20)
CALCIUM SERPL-MCNC: 9.3 MG/DL (ref 8.5–10.1)
CHLORIDE SERPL-SCNC: 104 MMOL/L (ref 100–111)
CHOLEST SERPL-MCNC: 220 MG/DL
CO2 SERPL-SCNC: 27 MMOL/L (ref 21–32)
CREAT SERPL-MCNC: 0.82 MG/DL (ref 0.6–1.3)
DIFFERENTIAL METHOD BLD: NORMAL
EOSINOPHIL # BLD: 0.2 K/UL (ref 0–0.4)
EOSINOPHIL NFR BLD: 2 % (ref 0–5)
ERYTHROCYTE [DISTWIDTH] IN BLOOD BY AUTOMATED COUNT: 13.1 % (ref 11.6–14.5)
EST. AVERAGE GLUCOSE BLD GHB EST-MCNC: 123 MG/DL
GLOBULIN SER CALC-MCNC: 3 G/DL (ref 2–4)
GLUCOSE SERPL-MCNC: 91 MG/DL (ref 74–99)
HBA1C MFR BLD: 5.9 % (ref 4.2–5.6)
HCT VFR BLD AUTO: 43.9 % (ref 36–48)
HDLC SERPL-MCNC: 43 MG/DL (ref 40–60)
HDLC SERPL: 5.1 {RATIO} (ref 0–5)
HGB BLD-MCNC: 15.5 G/DL (ref 13–16)
LDLC SERPL CALC-MCNC: 152.2 MG/DL (ref 0–100)
LIPID PROFILE,FLP: ABNORMAL
LYMPHOCYTES # BLD: 2.8 K/UL (ref 0.9–3.6)
LYMPHOCYTES NFR BLD: 28 % (ref 21–52)
MCH RBC QN AUTO: 30.2 PG (ref 24–34)
MCHC RBC AUTO-ENTMCNC: 35.3 G/DL (ref 31–37)
MCV RBC AUTO: 85.4 FL (ref 74–97)
MONOCYTES # BLD: 0.7 K/UL (ref 0.05–1.2)
MONOCYTES NFR BLD: 7 % (ref 3–10)
NEUTS SEG # BLD: 6.3 K/UL (ref 1.8–8)
NEUTS SEG NFR BLD: 63 % (ref 40–73)
PLATELET # BLD AUTO: 280 K/UL (ref 135–420)
PMV BLD AUTO: 10.6 FL (ref 9.2–11.8)
POTASSIUM SERPL-SCNC: 3.6 MMOL/L (ref 3.5–5.5)
PROT SERPL-MCNC: 7.5 G/DL (ref 6.4–8.2)
PSA SERPL-MCNC: 0.8 NG/ML (ref 0–4)
RBC # BLD AUTO: 5.14 M/UL (ref 4.7–5.5)
SODIUM SERPL-SCNC: 140 MMOL/L (ref 136–145)
T4 FREE SERPL-MCNC: 1.2 NG/DL (ref 0.7–1.5)
TRIGL SERPL-MCNC: 124 MG/DL (ref ?–150)
TSH SERPL DL<=0.05 MIU/L-ACNC: 1.74 UIU/ML (ref 0.36–3.74)
VLDLC SERPL CALC-MCNC: 24.8 MG/DL
WBC # BLD AUTO: 10 K/UL (ref 4.6–13.2)

## 2020-07-17 PROCEDURE — 84153 ASSAY OF PSA TOTAL: CPT

## 2020-07-17 PROCEDURE — 36415 COLL VENOUS BLD VENIPUNCTURE: CPT

## 2020-07-17 PROCEDURE — 84443 ASSAY THYROID STIM HORMONE: CPT

## 2020-07-17 PROCEDURE — 83036 HEMOGLOBIN GLYCOSYLATED A1C: CPT

## 2020-07-17 PROCEDURE — 85025 COMPLETE CBC W/AUTO DIFF WBC: CPT

## 2020-07-17 PROCEDURE — 80061 LIPID PANEL: CPT

## 2020-07-17 PROCEDURE — 84439 ASSAY OF FREE THYROXINE: CPT

## 2020-07-17 PROCEDURE — 80053 COMPREHEN METABOLIC PANEL: CPT

## 2020-07-27 ENCOUNTER — TELEPHONE (OUTPATIENT)
Dept: INTERNAL MEDICINE CLINIC | Age: 60
End: 2020-07-27

## 2020-07-27 ENCOUNTER — OFFICE VISIT (OUTPATIENT)
Dept: INTERNAL MEDICINE CLINIC | Age: 60
End: 2020-07-27

## 2020-07-27 VITALS
DIASTOLIC BLOOD PRESSURE: 74 MMHG | BODY MASS INDEX: 35.01 KG/M2 | RESPIRATION RATE: 12 BRPM | SYSTOLIC BLOOD PRESSURE: 134 MMHG | HEIGHT: 68 IN | HEART RATE: 72 BPM | WEIGHT: 231 LBS | TEMPERATURE: 97.6 F

## 2020-07-27 DIAGNOSIS — Z00.00 ROUTINE GENERAL MEDICAL EXAMINATION AT A HEALTH CARE FACILITY: Primary | ICD-10-CM

## 2020-07-27 DIAGNOSIS — I10 PRIMARY HYPERTENSION: ICD-10-CM

## 2020-07-27 DIAGNOSIS — R63.5 WEIGHT GAIN: ICD-10-CM

## 2020-07-27 DIAGNOSIS — N52.01 ERECTILE DYSFUNCTION DUE TO ARTERIAL INSUFFICIENCY: ICD-10-CM

## 2020-07-27 DIAGNOSIS — R73.01 IMPAIRED FASTING GLUCOSE: ICD-10-CM

## 2020-07-27 DIAGNOSIS — E66.9 OBESITY (BMI 30-39.9): ICD-10-CM

## 2020-07-27 RX ORDER — HYDROCHLOROTHIAZIDE 25 MG/1
TABLET ORAL
Qty: 90 TAB | Refills: 3 | Status: SHIPPED | OUTPATIENT
Start: 2020-07-27 | End: 2021-09-03 | Stop reason: SDUPTHER

## 2020-07-27 RX ORDER — OMEPRAZOLE 40 MG/1
40 CAPSULE, DELAYED RELEASE ORAL DAILY
Qty: 90 CAP | Refills: 3 | Status: SHIPPED | OUTPATIENT
Start: 2020-07-27 | End: 2021-09-03 | Stop reason: SDUPTHER

## 2020-07-27 RX ORDER — SILDENAFIL 100 MG/1
TABLET, FILM COATED ORAL
Qty: 30 TAB | Refills: 5 | Status: SHIPPED | OUTPATIENT
Start: 2020-07-27

## 2020-07-27 NOTE — PROGRESS NOTES
Raquel Liao,born 1960, is a 64 y.o. male, who is seen today for a routine physical exam and follow-up on reflux impaired fasting glucose hypertension erectile dysfunction and obesity. He tries to diet off-and-on but not all the time and his weight is unchanged from a year ago. He uses sildenafil for erectile dysfunction and sometimes it helps and occasionally it does not. He occasionally has reflux even with using omeprazole, once or twice a week typically. His fasting glucose was slightly elevated when I saw him little over a year ago. He does take his blood pressure medicine every day. Past Medical History:   Diagnosis Date    Basal cell carcinoma     being treated.  Gastrointestinal disorder     GERD (gastroesophageal reflux disease)     Hypertension     Sleep apnea     uses CPAP     Past Surgical History:   Procedure Laterality Date    HX HEENT      Ear Tubes    HX LYMPHADENECTOMY      left axilla    HX MOHS PROCEDURES      nose    HX OTHER SURGICAL      Melanoma removed on back      Current Outpatient Medications   Medication Sig Dispense Refill    sildenafil citrate (VIAGRA) 100 mg tablet 1 tablet by mouth at least 1 hour before intercourse 30 Tab 5    hydroCHLOROthiazide (HYDRODIURIL) 25 mg tablet TAKE 1 TABLET BY MOUTH  DAILY 90 Tab 3    omeprazole (PRILOSEC) 40 mg capsule Take 1 Cap by mouth daily. 90 Cap 3    mometasone (NASONEX) 50 mcg/actuation nasal spray SHAKE LQ AND U 2 SPRAYS IEN QD  11    ibuprofen (MOTRIN) 400 mg tablet Take  by mouth every six (6) hours as needed for Pain.        No Known Allergies  Social History     Socioeconomic History    Marital status:      Spouse name: Not on file    Number of children: Not on file    Years of education: Not on file    Highest education level: Not on file   Tobacco Use    Smoking status: Never Smoker    Smokeless tobacco: Never Used   Substance and Sexual Activity    Alcohol use: Yes     Comment: occasional  Drug use: No    Sexual activity: Not Currently     Visit Vitals  /74   Pulse 72   Temp 97.6 °F (36.4 °C) (Temporal)   Resp 12   Ht 5' 8\" (1.727 m)   Wt 231 lb (104.8 kg)   BMI 35.12 kg/m²     Neck reveals no adenopathy or thyromegaly. Carotids are 2+ without bruits. Lungs are clear to percussion. Good breath sounds with no wheezing or crackles. Heart reveals a regular rhythm with normal S1 and S2 no murmur gallop click or rub. Apical impulse is not palpable. Abdomen is obese soft nontender with no obvious hepatosplenomegaly or masses. No bruits. Extremities reveal no clubbing cyanosis or edema. Pulses are 2+. Results for orders placed or performed during the hospital encounter of 07/17/20   CBC WITH AUTOMATED DIFF   Result Value Ref Range    WBC 10.0 4.6 - 13.2 K/uL    RBC 5.14 4.70 - 5.50 M/uL    HGB 15.5 13.0 - 16.0 g/dL    HCT 43.9 36.0 - 48.0 %    MCV 85.4 74.0 - 97.0 FL    MCH 30.2 24.0 - 34.0 PG    MCHC 35.3 31.0 - 37.0 g/dL    RDW 13.1 11.6 - 14.5 %    PLATELET 219 067 - 950 K/uL    MPV 10.6 9.2 - 11.8 FL    NEUTROPHILS 63 40 - 73 %    LYMPHOCYTES 28 21 - 52 %    MONOCYTES 7 3 - 10 %    EOSINOPHILS 2 0 - 5 %    BASOPHILS 0 0 - 2 %    ABS. NEUTROPHILS 6.3 1.8 - 8.0 K/UL    ABS. LYMPHOCYTES 2.8 0.9 - 3.6 K/UL    ABS. MONOCYTES 0.7 0.05 - 1.2 K/UL    ABS. EOSINOPHILS 0.2 0.0 - 0.4 K/UL    ABS.  BASOPHILS 0.0 0.0 - 0.1 K/UL    DF AUTOMATED     LIPID PANEL   Result Value Ref Range    LIPID PROFILE          Cholesterol, total 220 (H) <200 MG/DL    Triglyceride 124 <150 MG/DL    HDL Cholesterol 43 40 - 60 MG/DL    LDL, calculated 152.2 (H) 0 - 100 MG/DL    VLDL, calculated 24.8 MG/DL    CHOL/HDL Ratio 5.1 (H) 0 - 5.0     METABOLIC PANEL, COMPREHENSIVE   Result Value Ref Range    Sodium 140 136 - 145 mmol/L    Potassium 3.6 3.5 - 5.5 mmol/L    Chloride 104 100 - 111 mmol/L    CO2 27 21 - 32 mmol/L    Anion gap 9 3.0 - 18 mmol/L    Glucose 91 74 - 99 mg/dL    BUN 12 7.0 - 18 MG/DL    Creatinine 0. 82 0.6 - 1.3 MG/DL    BUN/Creatinine ratio 15 12 - 20      GFR est AA >60 >60 ml/min/1.73m2    GFR est non-AA >60 >60 ml/min/1.73m2    Calcium 9.3 8.5 - 10.1 MG/DL    Bilirubin, total 0.6 0.2 - 1.0 MG/DL    ALT (SGPT) 24 16 - 61 U/L    AST (SGOT) 11 10 - 38 U/L    Alk. phosphatase 61 45 - 117 U/L    Protein, total 7.5 6.4 - 8.2 g/dL    Albumin 4.5 3.4 - 5.0 g/dL    Globulin 3.0 2.0 - 4.0 g/dL    A-G Ratio 1.5 0.8 - 1.7     T4, FREE   Result Value Ref Range    T4, Free 1.2 0.7 - 1.5 NG/DL   TSH 3RD GENERATION   Result Value Ref Range    TSH 1.74 0.36 - 3.74 uIU/mL   PSA SCREENING (SCREENING)   Result Value Ref Range    Prostate Specific Ag 0.8 0.0 - 4.0 ng/mL   HEMOGLOBIN A1C WITH EAG   Result Value Ref Range    Hemoglobin A1c 5.9 (H) 4.2 - 5.6 %    Est. average glucose 123 mg/dL     Assessment: 1. Hypertension is controlled, he will continue hydrochlorothiazide 25 mg daily. #2.  Impaired fasting glucose little over a year ago now glucose is normal but hemoglobin A1c is slightly above normal.  I Told him this is consistent with impaired fasting glucose and he needs to do the best he can to work on weight loss including avoiding sweets in his diet and cutting down on white starches and eating more vegetables and salads in their place. #3.  Obesity unchanged from a year ago, encouraged him to work on weight loss as above particularly in view of impaired fasting glucose. #4.  Erectile dysfunction with 100 mg of sildenafil usually helping him but not always. I have changed the dosage from the 20 mg size to the 100 mg size and he will fill that at his local pharmacy with a coupon. #5.  Reflux not totally controlled with Meprazole 20 mg, we will increase to the 40 mg size. Follow-up in 1 year for a physical with Dr. Dipesh eL. Robb Yoon MD FACP    Please note: This document has been produced using voice recognition software. Unrecognized errors in transcription may be present. Maximino Bose

## 2021-08-11 ENCOUNTER — TELEPHONE (OUTPATIENT)
Dept: INTERNAL MEDICINE CLINIC | Age: 61
End: 2021-08-11

## 2021-08-11 DIAGNOSIS — I10 PRIMARY HYPERTENSION: Primary | ICD-10-CM

## 2021-08-11 DIAGNOSIS — R73.09 ELEVATED HEMOGLOBIN A1C: ICD-10-CM

## 2021-08-11 DIAGNOSIS — E78.5 DYSLIPIDEMIA: ICD-10-CM

## 2021-08-26 ENCOUNTER — HOSPITAL ENCOUNTER (OUTPATIENT)
Dept: LAB | Age: 61
Discharge: HOME | End: 2021-08-26
Payer: COMMERCIAL

## 2021-08-26 DIAGNOSIS — R73.09 ELEVATED HEMOGLOBIN A1C: ICD-10-CM

## 2021-08-26 DIAGNOSIS — E78.5 DYSLIPIDEMIA: ICD-10-CM

## 2021-08-26 DIAGNOSIS — I10 PRIMARY HYPERTENSION: ICD-10-CM

## 2021-08-26 LAB
ALBUMIN SERPL-MCNC: 4.1 G/DL (ref 3.4–5)
ALBUMIN/GLOB SERPL: 1.2 {RATIO} (ref 0.8–1.7)
ALP SERPL-CCNC: 85 U/L (ref 45–117)
ALT SERPL-CCNC: 31 U/L (ref 16–61)
ANION GAP SERPL CALC-SCNC: 6 MMOL/L (ref 3–18)
AST SERPL-CCNC: 13 U/L (ref 10–38)
BILIRUB SERPL-MCNC: 0.6 MG/DL (ref 0.2–1)
BUN SERPL-MCNC: 10 MG/DL (ref 7–18)
BUN/CREAT SERPL: 13 (ref 12–20)
CALCIUM SERPL-MCNC: 9.4 MG/DL (ref 8.5–10.1)
CHLORIDE SERPL-SCNC: 100 MMOL/L (ref 100–111)
CHOLEST SERPL-MCNC: 237 MG/DL
CO2 SERPL-SCNC: 31 MMOL/L (ref 21–32)
CREAT SERPL-MCNC: 0.77 MG/DL (ref 0.6–1.3)
EST. AVERAGE GLUCOSE BLD GHB EST-MCNC: 266 MG/DL
GLOBULIN SER CALC-MCNC: 3.3 G/DL (ref 2–4)
GLUCOSE SERPL-MCNC: 244 MG/DL (ref 74–99)
HBA1C MFR BLD: 10.9 % (ref 4.2–5.6)
HDLC SERPL-MCNC: 45 MG/DL (ref 40–60)
HDLC SERPL: 5.3 {RATIO} (ref 0–5)
LDLC SERPL CALC-MCNC: 150.8 MG/DL (ref 0–100)
LIPID PROFILE,FLP: ABNORMAL
POTASSIUM SERPL-SCNC: 3.6 MMOL/L (ref 3.5–5.5)
PROT SERPL-MCNC: 7.4 G/DL (ref 6.4–8.2)
SODIUM SERPL-SCNC: 137 MMOL/L (ref 136–145)
TRIGL SERPL-MCNC: 206 MG/DL (ref ?–150)
VLDLC SERPL CALC-MCNC: 41.2 MG/DL

## 2021-08-26 PROCEDURE — 80061 LIPID PANEL: CPT

## 2021-08-26 PROCEDURE — 36415 COLL VENOUS BLD VENIPUNCTURE: CPT

## 2021-08-26 PROCEDURE — 83036 HEMOGLOBIN GLYCOSYLATED A1C: CPT

## 2021-08-26 PROCEDURE — 80053 COMPREHEN METABOLIC PANEL: CPT

## 2021-08-26 NOTE — PROGRESS NOTES
New patient appointment 9/2/20211. A1c 10.9.  1 year ago A1c was 5.9  2.  ; LDL 150D.   Kidneys and liver within normal limits

## 2021-09-02 ENCOUNTER — TELEPHONE (OUTPATIENT)
Dept: INTERNAL MEDICINE CLINIC | Age: 61
End: 2021-09-02

## 2021-09-02 ENCOUNTER — OFFICE VISIT (OUTPATIENT)
Dept: INTERNAL MEDICINE CLINIC | Age: 61
End: 2021-09-02
Payer: COMMERCIAL

## 2021-09-02 VITALS
BODY MASS INDEX: 34.13 KG/M2 | OXYGEN SATURATION: 98 % | TEMPERATURE: 97.2 F | SYSTOLIC BLOOD PRESSURE: 119 MMHG | HEIGHT: 68 IN | WEIGHT: 225.2 LBS | RESPIRATION RATE: 16 BRPM | DIASTOLIC BLOOD PRESSURE: 72 MMHG | HEART RATE: 84 BPM

## 2021-09-02 DIAGNOSIS — E66.9 OBESITY (BMI 30-39.9): ICD-10-CM

## 2021-09-02 DIAGNOSIS — G62.9 NEUROPATHY: ICD-10-CM

## 2021-09-02 DIAGNOSIS — G47.33 OBSTRUCTIVE SLEEP APNEA: ICD-10-CM

## 2021-09-02 DIAGNOSIS — E78.2 MIXED HYPERLIPIDEMIA: ICD-10-CM

## 2021-09-02 DIAGNOSIS — Z76.89 ENCOUNTER TO ESTABLISH CARE WITH NEW DOCTOR: Primary | ICD-10-CM

## 2021-09-02 DIAGNOSIS — I10 PRIMARY HYPERTENSION: ICD-10-CM

## 2021-09-02 DIAGNOSIS — E11.65 TYPE 2 DIABETES MELLITUS WITH HYPERGLYCEMIA, WITHOUT LONG-TERM CURRENT USE OF INSULIN (HCC): ICD-10-CM

## 2021-09-02 DIAGNOSIS — K21.9 GASTROESOPHAGEAL REFLUX DISEASE WITHOUT ESOPHAGITIS: ICD-10-CM

## 2021-09-02 PROCEDURE — 99215 OFFICE O/P EST HI 40 MIN: CPT | Performed by: NURSE PRACTITIONER

## 2021-09-02 RX ORDER — ATORVASTATIN CALCIUM 20 MG/1
20 TABLET, FILM COATED ORAL DAILY
Qty: 90 TABLET | Refills: 1 | Status: SHIPPED | OUTPATIENT
Start: 2021-09-02 | End: 2022-02-16 | Stop reason: SDUPTHER

## 2021-09-02 RX ORDER — METFORMIN HYDROCHLORIDE 500 MG/1
500 TABLET ORAL 2 TIMES DAILY WITH MEALS
Qty: 180 TABLET | Refills: 0 | Status: SHIPPED | OUTPATIENT
Start: 2021-09-02 | End: 2021-11-03

## 2021-09-02 NOTE — PROGRESS NOTES
Chief Complaint   Patient presents with   [facility] Establish Care       1. Have you been to the ER, urgent care clinic since your last visit? Hospitalized since your last visit? No    2. Have you seen or consulted any other health care providers outside of the [address] [place] [address] since your last visit? Include any pap smears or colon screening.  No

## 2021-09-02 NOTE — PROGRESS NOTES
[facility] [id number] [facility]   [place], [address]  [phone] [facility]/[phone] fax    9/2/2021    HPI:   Saniya Roman 1960 is a pleasant WHITE/NON- male who presents today to establish care and for routine physical exam.  He is single. 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 Todays concern:  He has been experiencing tingling in his feet intermittently along with numbness in his heels. This has been going on for some time now. He does not have issues with ambulation. He denies previous back surgeries or trauma. GERD: Continues omeprazole as needed for symptoms. He does avoid foods that may irritate his gut. Hypertension: BP well controlled with current medication regimen of hydrochlorothiazide. He denies chest pain shortness of breath fatigue edema. Obesity: He admits to lacking with exercise. At one time he was exercising on a regular basis and omitting carbs and sweets from his diet. Since the Covid pandemic he has not been exercising and his diet has returned back to being unhealthy. Past Medical History:   Diagnosis Date    Basal cell carcinoma     being treated.  Gastrointestinal disorder     GERD (gastroesophageal reflux disease)     Hypertension     Sleep apnea     uses CPAP     Past Surgical History:   Procedure Laterality Date    HX HEENT      Ear Tubes    HX LYMPHADENECTOMY      left axilla    HX MOHS PROCEDURES      nose    HX OTHER SURGICAL      Melanoma removed on back      Current Outpatient Medications   Medication Sig    hydroCHLOROthiazide (HYDRODIURIL) 25 mg tablet TAKE 1 TABLET BY MOUTH  DAILY    omeprazole (PRILOSEC) 40 mg capsule Take 1 Capsule by mouth daily as needed for Other (Reflux).  metFORMIN (GLUCOPHAGE) 500 mg tablet Take 1 Tablet by mouth two (2) times daily (with meals). For diabetes    atorvastatin (LIPITOR) 20 mg tablet Take 1 Tablet by mouth daily.  Indications: high cholesterol and high triglycerides    sildenafil citrate (VIAGRA) 100 mg tablet 1 tablet by mouth at least 1 hour before intercourse    ibuprofen (MOTRIN) 400 mg tablet Take  by mouth every six (6) hours as needed for Pain. No current facility-administered medications for this visit. Allergies and Intolerances:   No Known Allergies  Family History:   No family history on file. Social History:   He  reports that he has never smoked. He has never used smokeless tobacco.   Social History     Substance and Sexual Activity   Alcohol Use Yes    Comment: occasional     Immunization History:  Immunization History   Administered Date(s) Administered    Covid-19, MODERNA, Mrna, Lnp-s, Pf, 100mcg/0.5mL 03/31/2021, 04/28/2021       Review of Systems:   As above included in HPI. Otherwise 11 point review of systems negative including constitutional, skin, HENT, eyes, respiratory, cardiovascular, gastrointestinal, genitourinary, musculoskeletal, endocrine, hematologic, allergy, and neurologic. Physical:   Visit Vitals  /72   Pulse 84   Temp 97.2 °F (36.2 °C) (Temporal)   Resp 16   Ht 5' 8\" (1.727 m)   Wt 225 lb 3.2 oz (102.2 kg)   SpO2 98%   BMI 34.24 kg/m²      Wt Readings from Last 3 Encounters:   09/02/21 225 lb 3.2 oz (102.2 kg)   07/27/20 231 lb (104.8 kg)   09/13/19 231 lb (104.8 kg)         Exam:   Physical Exam  Vitals and nursing note reviewed. Constitutional:       Appearance: Normal appearance. He is obese. HENT:      Head: Normocephalic and atraumatic. Right Ear: External ear normal.      Left Ear: External ear normal.   Eyes:      Extraocular Movements: Extraocular movements intact. Conjunctiva/sclera: Conjunctivae normal.   Neck:      Vascular: No carotid bruit. Cardiovascular:      Rate and Rhythm: Normal rate and regular rhythm. Pulses: Normal pulses. Heart sounds: Normal heart sounds.       Comments: No edema noted  Pulmonary:      Effort: Pulmonary effort is normal. No respiratory distress. Breath sounds: Normal breath sounds. No wheezing. Abdominal:      General: Abdomen is flat. Bowel sounds are normal. There is no distension. Palpations: Abdomen is soft. Tenderness: There is no abdominal tenderness. Musculoskeletal:         General: Normal range of motion. Cervical back: Normal range of motion and neck supple. Comments: Gait stable   Skin:     General: Skin is warm and dry. Neurological:      General: No focal deficit present. Mental Status: He is alert and oriented to person, place, and time. Psychiatric:         Mood and Affect: Mood normal.         Behavior: Behavior normal.         Thought Content: Thought content normal.         Judgment: Judgment normal.       Review of Data:  Labs reviewed: Yes  1. A1c 10.9.  1 year ago A1c was 5.9  2.  ;   3. Kidneys and liver within normal limits      Plan:    ICD-10-CM ICD-9-CM    1. Encounter to establish care with new doctor  Z76.89 V65.8    2. Type 2 diabetes mellitus with hyperglycemia, without long-term current use of insulin (HCC)  E11.65 250.00 metFORMIN (GLUCOPHAGE) 500 mg tablet     790.29 HEMOGLOBIN A1C WITH EAG      METABOLIC PANEL, COMPREHENSIVE      MICROALBUMIN, UR, RAND W/ MICROALB/CREAT RATIO   3. Neuropathy  G62.9 355.9    4. Mixed hyperlipidemia  E78.2 272.2 atorvastatin (LIPITOR) 20 mg tablet      LIPID PANEL   5. Primary hypertension  I10 401.9 hydroCHLOROthiazide (HYDRODIURIL) 25 mg tablet      METABOLIC PANEL, COMPREHENSIVE   6. Gastroesophageal reflux disease without esophagitis  K21.9 530.81 omeprazole (PRILOSEC) 40 mg capsule   7. Obstructive sleep apnea  G47.33 327.23    8. Obesity (BMI 30-39. 9)  E66.9 278.00      Encounter to establish care with new provider  Patient is transferring to me from their previous provider.  I spent no less than 25 minutes reviewing and updating the chart to include previous labs, diagnostics, and specialty notes.    Reviewed medication and completed the medication reconciliation with the patient. Reviewed side effects of medications with the patient. Questions were answered and patient verb understanding. Informed patient chronic medication refills will not be given between their scheduled appointments; all refills will be given at the time of their scheduled follow-up appointments. Patient verbalized understanding    -Type 2 diabetes  A1c 10.9. A1c goal <7  start Metformin 500 mg twice daily  Instructed patient to cut back on carbs and sugary foods  Increase exercise  Encourage weight loss  Increase water intake    Reviewed current value and goal related to age, discussed dietary changes including to select low sugar and low simple carbohydrates and eating stable protein throughout the day, medications with the correct way to take them and side effects that should be reported such as muscle pain, weakness, near syncope. Discussed consequences of poor management with vascular stress, increased occurrence of neuropathy causing pain and disability, decreased circulation and increased occurrence of infection resulting in extremity amputation and general illness, and increased incidence of death by renal failure, stroke and MI.     Educated patient on the importance of maintaining proper skin/foot care as diabetics can have poor vascular circulation that could lead to ulcers. Educated patient on the importance of following up with a podiatrist and if unable to see a podiatrist then to make sure they cut the nails straight across to prevent any trauma to the skin. Educated patient on the importance of wearing proper fitting shoes, avoid soaking feet in hot water or Epson salt, and never walk barefoot as this exposes the feet to possible trauma. Refer to Pharm. D. Yes    Neuropathy  No intervention needed at this time  Please symptoms will subside once A1c is under control.    -Mixed hyperlipidemia  ;  TG 150  start atorvastatin milligrams daily  Reduce fried fatty or oily foods in diet  Limit red meats and alcohol. Limit fast food  Work on weight reduction  Increase water intake    Hypertension  continue HCTZ 25 mg daily  Limit sodium in diet to 2g/d  Avoid pork  Initiate cardio exercise  Weight reduction  Increase water intake  Report BP readings greater than 139/89 to office    GERD  continue omeprazole daily as needed  Avoid gut irritants such as spicy foods  Avoid laying down for 30 to 45 minutes after eating  Avoid excessive alcohol consumption    -Obstructive sleep apnea  Educated patient on the severity of this diagnosis. Educated him on the different masks that can be utilized. He is not interested at this time. Obesity  BMI is out of normal parameters and plan is as follows: Discussed in great detail on diet, portion control, exercise, avoiding foods high in sugar, carbs and starches, fatty/greasy foods and to eat until satisfied not til full. I have counseled this patient on diet and exercise regimens as well. Patient verbalized understanding. Follow up 3 months  Labs needed 1 week prior to appt:  Yes  A1c, lipid, CMP, microalbumin    Dr. Karley Betancourt, AGNP-C, DNP  [facility] [address] [place] [address]

## 2021-09-02 NOTE — TELEPHONE ENCOUNTER
Pt had OV today and Dr Brad Wills is requesting he see you as soon as possible. He made an appt for 09/29/21 but if you can get him in sooner he is agreeable. If possible can it be 2 PM on a Wednesday or please call him and he will try to work out coming in for a different day/time.

## 2021-09-03 RX ORDER — HYDROCHLOROTHIAZIDE 25 MG/1
TABLET ORAL
Qty: 90 TABLET | Refills: 0 | Status: SHIPPED | OUTPATIENT
Start: 2021-09-03 | End: 2021-09-13 | Stop reason: SDUPTHER

## 2021-09-03 RX ORDER — OMEPRAZOLE 40 MG/1
40 CAPSULE, DELAYED RELEASE ORAL
Qty: 90 CAPSULE | Refills: 0 | Status: SHIPPED | OUTPATIENT
Start: 2021-09-03 | End: 2021-09-13 | Stop reason: SDUPTHER

## 2021-09-03 NOTE — TELEPHONE ENCOUNTER
Discussed with Dr. Ludmila Davis. Unable to get patient in for full visit sooner. Left message for patient to continue with instructions per Dr. Ludmila Davis and we will proceed with our visit scheduled for 9/29/21. Dr. Ludmila Davis was okay with this plan. Thank you,  SHERRY GutierrezS      For Pharmacy Admin Tracking Only     CPA in place:  Yes   Time Spent (min): 10

## 2021-09-13 ENCOUNTER — TELEPHONE (OUTPATIENT)
Dept: INTERNAL MEDICINE CLINIC | Age: 61
End: 2021-09-13

## 2021-09-13 DIAGNOSIS — K21.9 GASTROESOPHAGEAL REFLUX DISEASE WITHOUT ESOPHAGITIS: ICD-10-CM

## 2021-09-13 DIAGNOSIS — I10 PRIMARY HYPERTENSION: ICD-10-CM

## 2021-09-13 RX ORDER — HYDROCHLOROTHIAZIDE 25 MG/1
TABLET ORAL
Qty: 90 TABLET | Refills: 0 | Status: SHIPPED | OUTPATIENT
Start: 2021-09-13 | End: 2021-09-14 | Stop reason: SDUPTHER

## 2021-09-13 RX ORDER — OMEPRAZOLE 40 MG/1
40 CAPSULE, DELAYED RELEASE ORAL
Qty: 90 CAPSULE | Refills: 0 | Status: SHIPPED | OUTPATIENT
Start: 2021-09-13 | End: 2021-12-31 | Stop reason: SDUPTHER

## 2021-09-13 NOTE — TELEPHONE ENCOUNTER
Please advise. Rx was sent over to [facility] 90 d/s to hold him until next appointment. Can patient take HCTZ on empty stomach? Please advise.

## 2021-09-13 NOTE — TELEPHONE ENCOUNTER
Yes may take on an empty stomach. His 90-day supply of prescriptions were sent to [facility] and not mail order.   Thank you

## 2021-09-13 NOTE — TELEPHONE ENCOUNTER
Pt wants to know can Dr. Fiona Guardado send a prescription for blood pressure to [facility] on [address] until his mail order prescription. Pt wants to know can he take his medication on an empty stomach. Please advise.  Thank you!!!

## 2021-09-13 NOTE — TELEPHONE ENCOUNTER
Arianna Toribio is requesting a 90 day supply  Previous Rx's were sent to [facility]    Last Visit: 9/2/21 with BRIAN Coyle  Next Appointment: 11/24/21 with BRIAN Coyle    Requested Prescriptions     Pending Prescriptions Disp Refills    hydroCHLOROthiazide (HYDRODIURIL) 25 mg tablet 90 Tablet 0     Sig: TAKE 1 TABLET BY MOUTH  DAILY    omeprazole (PRILOSEC) 40 mg capsule 90 Capsule 0     Sig: Take 1 Capsule by mouth daily as needed for Other (Reflux).

## 2021-09-14 DIAGNOSIS — I10 PRIMARY HYPERTENSION: ICD-10-CM

## 2021-09-14 RX ORDER — HYDROCHLOROTHIAZIDE 25 MG/1
TABLET ORAL
Qty: 10 TABLET | Refills: 0 | Status: SHIPPED | OUTPATIENT
Start: 2021-09-14 | End: 2021-11-15

## 2021-09-14 NOTE — TELEPHONE ENCOUNTER
Requested Prescriptions     Signed Prescriptions Disp Refills    hydroCHLOROthiazide (HYDRODIURIL) 25 mg tablet 90 Tablet 0     Sig: TAKE 1 TABLET BY MOUTH  DAILY     Authorizing Provider: KIMBERLY DOWNING    omeprazole (PRILOSEC) 40 mg capsule 90 Capsule 0     Sig: Take 1 Capsule by mouth daily as needed for Other (Reflux).      Authorizing Provider: oZe Latif

## 2021-09-14 NOTE — TELEPHONE ENCOUNTER
Patient requesting HCTZ rx sent over to local pharmacy to hold him until [facility] delivers his 90 day supply.  Please advise

## 2021-09-29 ENCOUNTER — OFFICE VISIT (OUTPATIENT)
Dept: INTERNAL MEDICINE CLINIC | Age: 61
End: 2021-09-29

## 2021-09-29 DIAGNOSIS — E11.65 TYPE 2 DIABETES MELLITUS WITH HYPERGLYCEMIA, WITHOUT LONG-TERM CURRENT USE OF INSULIN (HCC): Primary | ICD-10-CM

## 2021-09-29 RX ORDER — FLASH GLUCOSE SENSOR
KIT MISCELLANEOUS
Qty: 2 KIT | Refills: 5 | Status: SHIPPED | OUTPATIENT
Start: 2021-09-29

## 2021-09-29 NOTE — PROGRESS NOTES
Pharmacy Progress Note - Diabetes Management    Assessment / Plan:   Diabetes Management:  - Per ADA guidelines, Pt's A1c is not at goal of < 7%. - Current SMBG(s)/CGM trend is unable to be assessed today since patient is not currently checking blood sugars   - Spent majority of our time today discussing the following: self-monitoring blood glucose fasting/post-prandial goals and/or technique, importance of blood glucose control in avoidance of diabetic complications or further progression if already present, signs/symptoms/management of hypoglycemia, impact of exercise on glucose control, and diet   - Patient is interested in CGM, so discussed potential costs and sent prescriptions to his pharmacy. Briefly reviewed device in clinic today using demo. - Will continue with metformin ER twice daily for now and reevaluate therapy after blood sugars can be reviewed  - Follow up in 1 month     Nutrition/Lifestyle Modifications:  - Not discussed in detail this visit   - Provided ADA resources on 1500 calories diet, nutrition label, food groups to assist Pt w/ decision making/meal planning  - Patient to bring questions to follow up visit          S/O: Mr. Joellen Montez is a 63 y.o. male, referred by Dr. Aamir Urbano was seen today for diabetes management follow up. Patient's last A1c was 10.9% in August 2021. Brief History: Patient states that he has had borderline blood sugars for several years. He even recalls being started on metformin in the past, but states that he was able to lose weight and change his diet which enabled him to stop the medication. He does endorse a family history of the diabetes (sister and mother) so he has been somewhat familiar with the disease and why it is important to control blood sugars. He states that the pandemic has been rough, and likely contributed to his increasing A1c.  After he had COVID, he started drinking sodas again and was not adhering to a diabetic-friendly diet. This led to symptoms of polyuria, fatigue and excessive thirst. He wasn't sure if the fatigue was coming from him working 2 jobs, but he now realizes that this could have been related to his high blood sugars as well. He presents today to establish care and to develop a plan for improved management moving forward. Current anti-hyperglycemic regimen include(s):    - Metformin  mg twice daily     Patient reports adherence to his medication regimen. ROS:  Today, Pt endorses:  - Symptoms of Hyperglycemia: excessive thirst, fatigue and polyuria  - Symptoms of Hypoglycemia: none    Self Monitoring Blood Glucose (SMBG) or CGM:  - Brought in home glucometer/blood glucose log/CGM reader today:  no  - Patient does not have any supplies to check blood sugars, but is in agreement to do so  - He is interested in CGM    Nutrition/Lifestyle Modifications:  - Not discussed this visit     Past Medical History:   Diagnosis Date    Basal cell carcinoma     being treated.  Gastrointestinal disorder     GERD (gastroesophageal reflux disease)     Hypertension     Sleep apnea     uses CPAP     No Known Allergies    Current Outpatient Medications   Medication Sig    flash glucose sensor (FreeStyle Shaunna 2 Sensor) kit Use as directed to check blood sugars 3 times daily.  hydroCHLOROthiazide (HYDRODIURIL) 25 mg tablet TAKE 1 TABLET BY MOUTH  DAILY    omeprazole (PRILOSEC) 40 mg capsule Take 1 Capsule by mouth daily as needed for Other (Reflux).  metFORMIN (GLUCOPHAGE) 500 mg tablet Take 1 Tablet by mouth two (2) times daily (with meals). For diabetes    atorvastatin (LIPITOR) 20 mg tablet Take 1 Tablet by mouth daily. Indications: high cholesterol and high triglycerides    sildenafil citrate (VIAGRA) 100 mg tablet 1 tablet by mouth at least 1 hour before intercourse    ibuprofen (MOTRIN) 400 mg tablet Take  by mouth every six (6) hours as needed for Pain.      No current facility-administered medications for this visit. Lab Results   Component Value Date/Time    Sodium 137 08/26/2021 02:10 PM    Potassium 3.6 08/26/2021 02:10 PM    Chloride 100 08/26/2021 02:10 PM    CO2 31 08/26/2021 02:10 PM    Anion gap 6 08/26/2021 02:10 PM    Glucose 244 (H) 08/26/2021 02:10 PM    BUN 10 08/26/2021 02:10 PM    Creatinine 0.77 08/26/2021 02:10 PM    BUN/Creatinine ratio 13 08/26/2021 02:10 PM    GFR est AA >60 08/26/2021 02:10 PM    GFR est non-AA >60 08/26/2021 02:10 PM    Calcium 9.4 08/26/2021 02:10 PM    Bilirubin, total 0.6 08/26/2021 02:10 PM    Alk. phosphatase 85 08/26/2021 02:10 PM    Protein, total 7.4 08/26/2021 02:10 PM    Albumin 4.1 08/26/2021 02:10 PM    Globulin 3.3 08/26/2021 02:10 PM    A-G Ratio 1.2 08/26/2021 02:10 PM    ALT (SGPT) 31 08/26/2021 02:10 PM       Lab Results   Component Value Date/Time    Cholesterol, total 237 (H) 08/26/2021 02:10 PM    HDL Cholesterol 45 08/26/2021 02:10 PM    LDL, calculated 150.8 (H) 08/26/2021 02:10 PM    VLDL, calculated 41.2 08/26/2021 02:10 PM    Triglyceride 206 (H) 08/26/2021 02:10 PM    CHOL/HDL Ratio 5.3 (H) 08/26/2021 02:10 PM       Lab Results   Component Value Date/Time    WBC 10.0 07/17/2020 01:34 PM    Hemoglobin, POC 13.3 05/23/2019 06:49 AM    HGB 15.5 07/17/2020 01:34 PM    Hematocrit, POC 39 05/23/2019 06:49 AM    HCT 43.9 07/17/2020 01:34 PM    PLATELET 409 32/76/4483 01:34 PM    MCV 85.4 07/17/2020 01:34 PM       No results found for: MCACR, MCA1, MCA2, MCA3, MCAU, MCAU2, MCALPOCT    HbA1c:  Lab Results   Component Value Date/Time    Hemoglobin A1c 10.9 (H) 08/26/2021 02:10 PM     No components found for: 2     Last Point of Care HGB A1C  No results found for: [id number]     CrCl cannot be calculated (Unknown ideal weight. ). Medication reconciliation was completed during the visit. There are no discontinued medications.     Patient verbalized understanding of the information presented and all of the patients questions were answered. AVS was handed to the patient. Patient advised to call the office with any additional questions or concerns. Patient will return to clinic in 4 week(s) for follow up. Thank you,  Rochelle Palumbo. SUNI Garcia        For Pharmacy Admin Tracking Only     CPA in place:  Yes   Recommendation Provided To: Patient/Caregiver: 2 via In person   Intervention Detail: New Rx: 2, reason: Patient Preference   Gap Closed?: No   Intervention Accepted By: Patient/Caregiver: 2   Time Spent (min): 60

## 2021-09-29 NOTE — PATIENT INSTRUCTIONS
Your Visit Summary:     Plan:  - Continue metformin 500 mg twice daily         Call me with any questions or concerns  Roland Kern [phone]    Check and document your blood sugar first thing in the morning (fasting), 1-2 hours after a meal, and/or before bedtime. Bring your meter/log to all future visits. Your blood sugar goals:  - Fasting (first thing in the morning)  blood sugar: 80 - 130   - 1 to 2 hours after a meal: less than 180   - CGM target goal: 80 - 180 greater     When you experience symptoms of low blood sugar (example: less than 70):  - Confirm low reading by checking your blood sugar.   - Then treat with 15 grams of carbohydrates (one-half cup of juice or regular soda, or 4-5 glucose tablets). - Wait 15 minutes to recheck blood sugar.   - Then eat a protein containing meal/snack to prevent another low blood sugar episode. (example: peanut butter + crackers)    Nutrition:  - When reviewing a nutrition label, focus on the serving size, total calories, fat (and type of fats), total carbohydrates, sugar (and amount of added sugar), amount of fiber (good for your digestive), and amount of protein. Refer to your nutrition label guide for more information.  - For a meal : max 45 - 60 grams of carbohydrates  - For a snack: max 15 grams of carbohydrates  - Reduce amount of saturated and trans fat. Consider more unsaturated fat options as they are better for your heart health.    - Have at least 1 serving of lean fat protein with each meal.    - Increase fiber intake slowly to prevent constipation.   - Substitute fruit juices for the whole fruit    Low carb snack ideas (15 grams total carb or less):     String cheese or babybel with 6 crackers   4 peanut butter crackers   3 cups of popcorn   1 cup raw vegetables with hummus or ranch dip (just need to watch how much dip you use)   Nuts   2 rice cakes   Celery with peanut butter or cream cheese   String cheese with 1 serving of fruit   Greek yogurt (look at label to make sure < 15 gram carb)   Plain greek yogurt with fresh berries added   Nature valley protein bar   Whisps parmesan cheese crisps   Hard boiled egg   Cottage cheese   Tuna salad lettuce roll-ups   Deli meat roll-ups with slice of cheese   Sugar Free Jello   Glucerna shake (16 grams)    Glucerna hunger smart shake (16 grams)   Ensure protein max shake   Fruit (1 serving/15 grams)   1/2 banana, barbara, or grapefruit    1/3 melon (small cantaloupe)   1 slice or 1 cup of honeydew melon   1 slice or 1 and 1/4 cups of watermelon    1 small apple, peach, orange or pear   2 small tangerines   1 cup of raspberries   3/4 cup of blackberries, blueberries or pineapples   1/2 cup of fruit juice, pears, applesauce, or mangos   17 small grapes   12 cherries    Be careful with the glucerna products as they differ in the total carbs depending on the product (some are intended as meal replacements not snacks). Make sure you look at the total carbs on the label as products can differ. Physical Activity:  - Aim for 30 minutes of consistent, moderately intensive, physical activity a day for 5 days or an average of 150 minutes per week. - Start slow, increase as tolerated. For example: Walk every day, working up to 30 minutes of brisk walking, 5 days a weekor split the 30 minutes into two 15-minute or three 10-minute walks. - If you sit for a long time, get up and move/stretch every 90 minutes. Other recommendations:  - Schedule an annual eye exam.  - Check your feet daily for any signs of open wounds, cuts, or sores. - Given your risk factors, the following vaccines are recommended: annual flu shot, age-based pneumococcal vaccines (Pneumovax, Prevnar 13). In addition to taking your medications as directed, improving your blood sugar involves modifying your nutrition and maximizing the amount of physical activity.

## 2021-10-04 ENCOUNTER — TELEPHONE (OUTPATIENT)
Dept: INTERNAL MEDICINE CLINIC | Age: 61
End: 2021-10-04

## 2021-10-04 NOTE — TELEPHONE ENCOUNTER
Pt states his phone is not compatible with the senia for the [facility] [place] [address] to order sensors. He is asking if there is another senia he can use? Also wants to know if he needs a prescription for the reader for the sensors? Please call him back at [phone], can leave a detailed message if he doesn't , he is working or can leave him a time when it would be best to call you back.

## 2021-10-27 ENCOUNTER — OFFICE VISIT (OUTPATIENT)
Dept: INTERNAL MEDICINE CLINIC | Age: 61
End: 2021-10-27

## 2021-10-27 DIAGNOSIS — E11.65 TYPE 2 DIABETES MELLITUS WITH HYPERGLYCEMIA, WITHOUT LONG-TERM CURRENT USE OF INSULIN (HCC): Primary | ICD-10-CM

## 2021-10-27 NOTE — PATIENT INSTRUCTIONS
Your Visit Summary:     Plan:  - Continue metformin 500 twice daily         Call me with any questions or concerns  Charlcie Libman [phone]    Check and document your blood sugar first thing in the morning (fasting), 1-2 hours after a meal, and/or before bedtime. Bring your meter/log to all future visits. Your blood sugar goals:  - Fasting (first thing in the morning)  blood sugar: 80 - 130   - 1 to 2 hours after a meal: less than 180     When you experience symptoms of low blood sugar (example: less than 70):  - Confirm low reading by checking your blood sugar.   - Then treat with 15 grams of carbohydrates (one-half cup of juice or regular soda, or 4-5 glucose tablets). - Wait 15 minutes to recheck blood sugar.   - Then eat a protein containing meal/snack to prevent another low blood sugar episode. (example: peanut butter + crackers)    Nutrition:  - When reviewing a nutrition label, focus on the serving size, total calories, fat (and type of fats), total carbohydrates, sugar (and amount of added sugar), amount of fiber (good for your digestive), and amount of protein. Refer to your nutrition label guide for more information.  - For a meal : max 45 - 60 grams of carbohydrates  - For a snack: max 15 grams of carbohydrates  - Reduce amount of saturated and trans fat. Consider more unsaturated fat options as they are better for your heart health.    - Have at least 1 serving of lean fat protein with each meal.    - Increase fiber intake slowly to prevent constipation.   - Substitute fruit juices for the whole fruit    Low carb snack ideas (15 grams total carb or less):     String cheese or babybel with 6 crackers   4 peanut butter crackers   3 cups of popcorn   1 cup raw vegetables with hummus or ranch dip (just need to watch how much dip you use)   Nuts   2 rice cakes   Celery with peanut butter or cream cheese   String cheese with 1 serving of fruit   Greek yogurt (look at label to make sure < 15 gram carb)   Plain greek yogurt with fresh berries added   Nature valley protein bar   Whisps parmesan cheese crisps   Hard boiled egg   Cottage cheese   Tuna salad lettuce roll-ups   Deli meat roll-ups with slice of cheese   Sugar Free Jello   Glucerna shake (16 grams)    Glucerna hunger smart shake (16 grams)   Ensure protein max shake   Fruit (1 serving/15 grams)   1/2 banana, barbara, or grapefruit    1/3 melon (small cantaloupe)   1 slice or 1 cup of honeydew melon   1 slice or 1 and 1/4 cups of watermelon    1 small apple, peach, orange or pear   2 small tangerines   1 cup of raspberries   3/4 cup of blackberries, blueberries or pineapples   1/2 cup of fruit juice, pears, applesauce, or mangos   17 small grapes   12 cherries    Be careful with the glucerna products as they differ in the total carbs depending on the product (some are intended as meal replacements not snacks). Make sure you look at the total carbs on the label as products can differ. Physical Activity:  - Aim for 30 minutes of consistent, moderately intensive, physical activity a day for 5 days or an average of 150 minutes per week. - Start slow, increase as tolerated. For example: Walk every day, working up to 30 minutes of brisk walking, 5 days a weekor split the 30 minutes into two 15-minute or three 10-minute walks. - If you sit for a long time, get up and move/stretch every 90 minutes. Other recommendations:  - Schedule an annual eye exam.  - Check your feet daily for any signs of open wounds, cuts, or sores. - Given your risk factors, the following vaccines are recommended: annual flu shot, age-based pneumococcal vaccines (Pneumovax, Prevnar 13). In addition to taking your medications as directed, improving your blood sugar involves modifying your nutrition and maximizing the amount of physical activity.

## 2021-10-29 NOTE — PROGRESS NOTES
Pharmacy Progress Note - Diabetes Management    Assessment / Plan:   Diabetes Management:  - Per ADA guidelines, Pt's A1c is not at goal of < 7%. - Current SMBG(s)/CGM trend looks great! Encouraged patient to keep up the great work   - Discussed keeping an eye on any readings below 70, as we may want to cut back on metformin. He is to notify PharmD if this occurs frequently   - Reviewed s/sx of hypoglycemia and appropriate management   - Otherwise, will continue current regimen and reassess control after next A1c check          S/O: Mr. John Paul Posadas is a 63 y.o. male, referred by Dr. Claudia Forde was seen today for diabetes management follow up. Patient's last A1c was 10.9% in August 2021. Interim update: Patient states that he is doing well since our last visit. He has been wearing the Freestyle Shaunna CGM and is learning a lot about how different foods impact his blood sugar. Current anti-hyperglycemic regimen include(s):    - Metformin 500 mg twice daily     ROS:  Today, Pt endorses:  - Symptoms of Hyperglycemia: none  - Symptoms of Hypoglycemia: none    Self Monitoring Blood Glucose (SMBG) or CGM:  - Brought in home glucometer/blood glucose log/CGM reader today:  yes  - Patient is using Freestyle Shaunna CGM          Nutrition/Lifestyle Modifications:  - Patient states that he pretty much cut out all sugar, including sodas and other sugary beverages  - He has cut down significantly on bread and may just eat an occasional sub  - He has also cut down on fast food and carb heavy meals like pasta/pizza  - Alcohol consumption? Yes, but cutting back on the amount he consumes as well     Past Medical History:   Diagnosis Date    Basal cell carcinoma     being treated.     Gastrointestinal disorder     GERD (gastroesophageal reflux disease)     Hypertension     Sleep apnea     uses CPAP     No Known Allergies    Current Outpatient Medications   Medication Sig    flash glucose sensor (FreeStyle Shaunna 2 Sensor) kit Use as directed to check blood sugars 3 times daily.  hydroCHLOROthiazide (HYDRODIURIL) 25 mg tablet TAKE 1 TABLET BY MOUTH  DAILY    omeprazole (PRILOSEC) 40 mg capsule Take 1 Capsule by mouth daily as needed for Other (Reflux).  metFORMIN (GLUCOPHAGE) 500 mg tablet Take 1 Tablet by mouth two (2) times daily (with meals). For diabetes    atorvastatin (LIPITOR) 20 mg tablet Take 1 Tablet by mouth daily. Indications: high cholesterol and high triglycerides    sildenafil citrate (VIAGRA) 100 mg tablet 1 tablet by mouth at least 1 hour before intercourse    ibuprofen (MOTRIN) 400 mg tablet Take  by mouth every six (6) hours as needed for Pain. No current facility-administered medications for this visit. Lab Results   Component Value Date/Time    Sodium 137 08/26/2021 02:10 PM    Potassium 3.6 08/26/2021 02:10 PM    Chloride 100 08/26/2021 02:10 PM    CO2 31 08/26/2021 02:10 PM    Anion gap 6 08/26/2021 02:10 PM    Glucose 244 (H) 08/26/2021 02:10 PM    BUN 10 08/26/2021 02:10 PM    Creatinine 0.77 08/26/2021 02:10 PM    BUN/Creatinine ratio 13 08/26/2021 02:10 PM    GFR est AA >60 08/26/2021 02:10 PM    GFR est non-AA >60 08/26/2021 02:10 PM    Calcium 9.4 08/26/2021 02:10 PM    Bilirubin, total 0.6 08/26/2021 02:10 PM    Alk.  phosphatase 85 08/26/2021 02:10 PM    Protein, total 7.4 08/26/2021 02:10 PM    Albumin 4.1 08/26/2021 02:10 PM    Globulin 3.3 08/26/2021 02:10 PM    A-G Ratio 1.2 08/26/2021 02:10 PM    ALT (SGPT) 31 08/26/2021 02:10 PM       Lab Results   Component Value Date/Time    Cholesterol, total 237 (H) 08/26/2021 02:10 PM    HDL Cholesterol 45 08/26/2021 02:10 PM    LDL, calculated 150.8 (H) 08/26/2021 02:10 PM    VLDL, calculated 41.2 08/26/2021 02:10 PM    Triglyceride 206 (H) 08/26/2021 02:10 PM    CHOL/HDL Ratio 5.3 (H) 08/26/2021 02:10 PM       Lab Results   Component Value Date/Time    WBC 10.0 07/17/2020 01:34 PM    Hemoglobin, POC 13.3 05/23/2019 06:49 AM    HGB 15.5 07/17/2020 01:34 PM    Hematocrit, POC 39 05/23/2019 06:49 AM    HCT 43.9 07/17/2020 01:34 PM    PLATELET 160 35/93/4602 01:34 PM    MCV 85.4 07/17/2020 01:34 PM       No results found for: MCACR, MCA1, MCA2, MCA3, MCAU, MCAU2, MCALPOCT    HbA1c:  Lab Results   Component Value Date/Time    Hemoglobin A1c 10.9 (H) 08/26/2021 02:10 PM     No components found for: 2     Last Point of Care HGB A1C  No results found for: [id number]     CrCl cannot be calculated (Unknown ideal weight. ). Medication reconciliation was completed during the visit. There are no discontinued medications. Patient verbalized understanding of the information presented and all of the patients questions were answered. AVS was handed to the patient. Patient advised to call the office with any additional questions or concerns. Thank you,  Michelle Trejo. SUNI Licea            For Pharmacy Admin Tracking Only     CPA in place:  Yes   Recommendation Provided To: Patient/Caregiver: 0 via In person   Time Spent (min): 60

## 2021-11-01 ENCOUNTER — TELEPHONE (OUTPATIENT)
Dept: INTERNAL MEDICINE CLINIC | Age: 61
End: 2021-11-01

## 2021-11-01 NOTE — TELEPHONE ENCOUNTER
Pt calling to speak to James Bucio about his [employer]. Says he has a few questions about it falling out of arm, needing refill early.

## 2021-11-02 NOTE — TELEPHONE ENCOUNTER
Spoke with patient. He states that he is able to go ahead and refill his prescription for new sensors since it is close to the time of his next refill anyway. Notified patient that if this happens again, he should call the [facility] line to get a replacement for free. Thank you,  Keysha Turcios. SUNI Peacock      For Pharmacy Admin Tracking Only     CPA in place:  Yes   Recommendation Provided To: Patient/Caregiver: 0 via Telephone   Time Spent (min): 15

## 2021-11-03 DIAGNOSIS — E11.65 TYPE 2 DIABETES MELLITUS WITH HYPERGLYCEMIA, WITHOUT LONG-TERM CURRENT USE OF INSULIN (HCC): ICD-10-CM

## 2021-11-03 RX ORDER — METFORMIN HYDROCHLORIDE 500 MG/1
TABLET ORAL
Qty: 180 TABLET | Refills: 0 | Status: SHIPPED | OUTPATIENT
Start: 2021-11-03 | End: 2021-11-24 | Stop reason: DRUGHIGH

## 2021-11-03 NOTE — TELEPHONE ENCOUNTER
PCP: Ena Mullen DNP    Last appt: 10/27/2021  Future Appointments   Date Time Provider Moody Messina   11/17/2021  2:00 PM IO LAB VISIT IO BS AMB   11/24/2021  2:30 PM Gamaliel Coyle DNP [facility] BS AMB       Requested Prescriptions     Pending Prescriptions Disp Refills    metFORMIN (GLUCOPHAGE) 500 mg tablet [Pharmacy Med Name: METFORMIN 500MG TABLETS] 180 Tablet 0     Sig: TAKE 1 TABLET BY MOUTH TWICE DAILY WITH MEALS FOR DIABETES

## 2021-11-10 ENCOUNTER — TELEPHONE (OUTPATIENT)
Dept: INTERNAL MEDICINE CLINIC | Age: 61
End: 2021-11-10

## 2021-11-10 NOTE — TELEPHONE ENCOUNTER
LVM for patient to return call. As long as there is no x-ray, CT scan, MRI or anything with strong electromagnetic fields, he is okay to have the sensor on. Thank you,  Ludmila Manzo. SUNI Manning          For Pharmacy Admin Tracking Only     CPA in place:  Yes   Recommendation Provided To: Patient/Caregiver: 0 via Telephone   Time Spent (min): 10

## 2021-11-10 NOTE — TELEPHONE ENCOUNTER
Patient called asking to speak with Bre Nguyen in regards to his glucose sensor. He has a couple of questions about it. Specifically, he will be doing an at home sleep study tonight, and he is wondering if Dr. Jen Siddiqi knows if he will have to take the sensor off prior to it or if he will still be able to wear it during the study? He has to go  the machine later today and they might tell him, but he also wanted to check with her just in case she knows so he can be better prepared for it. He is requesting a call back at your earliest convenience and can be reached at [phone]. Please advise, thank you!

## 2021-11-14 DIAGNOSIS — I10 PRIMARY HYPERTENSION: ICD-10-CM

## 2021-11-14 DIAGNOSIS — K21.9 GASTROESOPHAGEAL REFLUX DISEASE WITHOUT ESOPHAGITIS: ICD-10-CM

## 2021-11-15 RX ORDER — OMEPRAZOLE 40 MG/1
CAPSULE, DELAYED RELEASE ORAL
Qty: 90 CAPSULE | Refills: 3 | OUTPATIENT
Start: 2021-11-15

## 2021-11-15 RX ORDER — HYDROCHLOROTHIAZIDE 25 MG/1
TABLET ORAL
Qty: 90 TABLET | Refills: 0 | Status: SHIPPED | OUTPATIENT
Start: 2021-11-15 | End: 2021-11-21 | Stop reason: ALTCHOICE

## 2021-11-17 ENCOUNTER — HOSPITAL ENCOUNTER (OUTPATIENT)
Dept: LAB | Age: 61
Discharge: HOME | End: 2021-11-17
Payer: COMMERCIAL

## 2021-11-17 ENCOUNTER — APPOINTMENT (OUTPATIENT)
Dept: INTERNAL MEDICINE CLINIC | Age: 61
End: 2021-11-17

## 2021-11-17 DIAGNOSIS — E11.65 TYPE 2 DIABETES MELLITUS WITH HYPERGLYCEMIA, WITHOUT LONG-TERM CURRENT USE OF INSULIN (HCC): ICD-10-CM

## 2021-11-17 DIAGNOSIS — I10 PRIMARY HYPERTENSION: ICD-10-CM

## 2021-11-17 DIAGNOSIS — E78.2 MIXED HYPERLIPIDEMIA: ICD-10-CM

## 2021-11-17 LAB
ALBUMIN SERPL-MCNC: 4.2 G/DL (ref 3.4–5)
ALBUMIN/GLOB SERPL: 1.4 {RATIO} (ref 0.8–1.7)
ALP SERPL-CCNC: 69 U/L (ref 45–117)
ALT SERPL-CCNC: 29 U/L (ref 16–61)
ANION GAP SERPL CALC-SCNC: 8 MMOL/L (ref 3–18)
AST SERPL-CCNC: 15 U/L (ref 10–38)
BILIRUB SERPL-MCNC: 0.7 MG/DL (ref 0.2–1)
BUN SERPL-MCNC: 8 MG/DL (ref 7–18)
BUN/CREAT SERPL: 12 (ref 12–20)
CALCIUM SERPL-MCNC: 9.8 MG/DL (ref 8.5–10.1)
CHLORIDE SERPL-SCNC: 104 MMOL/L (ref 100–111)
CHOLEST SERPL-MCNC: 152 MG/DL
CO2 SERPL-SCNC: 28 MMOL/L (ref 21–32)
CREAT SERPL-MCNC: 0.68 MG/DL (ref 0.6–1.3)
CREAT UR-MCNC: 75 MG/DL (ref 30–125)
EST. AVERAGE GLUCOSE BLD GHB EST-MCNC: 143 MG/DL
GLOBULIN SER CALC-MCNC: 2.9 G/DL (ref 2–4)
GLUCOSE SERPL-MCNC: 111 MG/DL (ref 74–99)
HBA1C MFR BLD: 6.6 % (ref 4.2–5.6)
HDLC SERPL-MCNC: 45 MG/DL (ref 40–60)
HDLC SERPL: 3.4 {RATIO} (ref 0–5)
LDLC SERPL CALC-MCNC: 77.2 MG/DL (ref 0–100)
LIPID PROFILE,FLP: NORMAL
MICROALBUMIN UR-MCNC: <0.5 MG/DL (ref 0–3)
MICROALBUMIN/CREAT UR-RTO: NORMAL MG/G (ref 0–30)
POTASSIUM SERPL-SCNC: 3.3 MMOL/L (ref 3.5–5.5)
PROT SERPL-MCNC: 7.1 G/DL (ref 6.4–8.2)
SODIUM SERPL-SCNC: 140 MMOL/L (ref 136–145)
TRIGL SERPL-MCNC: 149 MG/DL (ref ?–150)
VLDLC SERPL CALC-MCNC: 29.8 MG/DL

## 2021-11-17 PROCEDURE — 80061 LIPID PANEL: CPT

## 2021-11-17 PROCEDURE — 80053 COMPREHEN METABOLIC PANEL: CPT

## 2021-11-17 PROCEDURE — 82043 UR ALBUMIN QUANTITATIVE: CPT

## 2021-11-17 PROCEDURE — 36415 COLL VENOUS BLD VENIPUNCTURE: CPT

## 2021-11-17 PROCEDURE — 83036 HEMOGLOBIN GLYCOSYLATED A1C: CPT

## 2021-11-21 DIAGNOSIS — I10 PRIMARY HYPERTENSION: Primary | ICD-10-CM

## 2021-11-21 DIAGNOSIS — E87.6 HYPOKALEMIA: ICD-10-CM

## 2021-11-21 RX ORDER — TRIAMTERENE CAPSULES 50 MG/1
50 CAPSULE ORAL 2 TIMES DAILY
Qty: 180 CAPSULE | Refills: 0 | Status: SHIPPED | OUTPATIENT
Start: 2021-11-21 | End: 2021-12-19 | Stop reason: ALTCHOICE

## 2021-11-22 NOTE — PROGRESS NOTES
Patient reached and made aware of following message per Dr. Forest Villa:    pls instruct pt to DC HCTZ and initiate triamterene. K+ 3.3-low. Thank you    -  Patient verbalized understanding.

## 2021-11-22 NOTE — PROGRESS NOTES
DC HCTZ and initiate Triamterene due to low K+ 3.3. ICD-10-CM ICD-9-CM    1. Primary hypertension  I10 401.9 triamterene (DYRENIUM) 50 mg capsule   2.  Hypokalemia  E87.6 276.8 triamterene (DYRENIUM) 50 mg capsule

## 2021-11-24 ENCOUNTER — OFFICE VISIT (OUTPATIENT)
Dept: INTERNAL MEDICINE CLINIC | Age: 61
End: 2021-11-24
Payer: COMMERCIAL

## 2021-11-24 ENCOUNTER — TELEPHONE (OUTPATIENT)
Dept: INTERNAL MEDICINE CLINIC | Age: 61
End: 2021-11-24

## 2021-11-24 VITALS
DIASTOLIC BLOOD PRESSURE: 68 MMHG | OXYGEN SATURATION: 98 % | TEMPERATURE: 97 F | HEIGHT: 68 IN | HEART RATE: 79 BPM | WEIGHT: 231.2 LBS | BODY MASS INDEX: 35.04 KG/M2 | SYSTOLIC BLOOD PRESSURE: 140 MMHG | RESPIRATION RATE: 16 BRPM

## 2021-11-24 DIAGNOSIS — K21.9 GASTROESOPHAGEAL REFLUX DISEASE WITHOUT ESOPHAGITIS: ICD-10-CM

## 2021-11-24 DIAGNOSIS — E78.2 MIXED HYPERLIPIDEMIA: ICD-10-CM

## 2021-11-24 DIAGNOSIS — I10 PRIMARY HYPERTENSION: ICD-10-CM

## 2021-11-24 DIAGNOSIS — E66.9 OBESITY (BMI 30-39.9): ICD-10-CM

## 2021-11-24 DIAGNOSIS — E87.6 HYPOKALEMIA: ICD-10-CM

## 2021-11-24 DIAGNOSIS — E11.65 TYPE 2 DIABETES MELLITUS WITH HYPERGLYCEMIA, WITHOUT LONG-TERM CURRENT USE OF INSULIN (HCC): Primary | ICD-10-CM

## 2021-11-24 PROCEDURE — 99214 OFFICE O/P EST MOD 30 MIN: CPT | Performed by: NURSE PRACTITIONER

## 2021-11-24 RX ORDER — METFORMIN HYDROCHLORIDE 1000 MG/1
1000 TABLET ORAL
Qty: 90 TABLET | Refills: 1 | Status: SHIPPED | OUTPATIENT
Start: 2021-11-24 | End: 2022-06-06

## 2021-11-24 NOTE — PROGRESS NOTES
Chief Complaint   Patient presents with    Follow-up     3 months    Labs     11/17/2021         1. \"Have you been to the ER, urgent care clinic since your last visit? Hospitalized since your last visit? \" no    2. \"Have you seen or consulted any other health care providers outside of the [address] [place] [address] since your last visit? \" no    3. For patients aged 39-70: Has the patient had a colonoscopy?  no

## 2021-11-24 NOTE — TELEPHONE ENCOUNTER
At check out today from [address] with Dr Higgins Congress pt mentioned per Dr Higgins Congress he may need to check in with you in about a month    If appt is needed he prefer it to be virtual or phone call.      Please advise

## 2021-11-24 NOTE — Clinical Note
Please reach out to patient and ask him to go to [facility] or [facility] lab the week of 12/13/21 to have his CMP rechecked- need to follow up on low potassium since changing medication.   Thank you

## 2021-11-24 NOTE — PROGRESS NOTES
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 He is single. Todays concern:  1. Hypertensionhe continued to take HCTZ instead of discontinuing this medication and starting the triamterene as directed on 11/21/2021 due to his pharmacy not having medication available. Diabetes: Tolerating Metformin without side effects. Blood sugars range in the 150s. Cholesterol: Taking a statin and tolerating well. GERD: Continues omeprazole as needed for symptoms. He does avoid foods that may irritate his gut. Hypertension: He denies chest pain shortness of breath fatigue edema. Tolerating medication without side effects. Obesity: He admits to lacking with exercise. He is working on his diet. Past Medical History:   Diagnosis Date    Basal cell carcinoma     being treated.  Gastrointestinal disorder     GERD (gastroesophageal reflux disease)     Hypertension     Sleep apnea     uses CPAP     Past Surgical History:   Procedure Laterality Date    HX HEENT      Ear Tubes    HX LYMPHADENECTOMY      left axilla    HX MOHS PROCEDURES      nose    HX OTHER SURGICAL      Melanoma removed on back      Current Outpatient Medications   Medication Sig    metFORMIN (GLUCOPHAGE) 1,000 mg tablet Take 1 Tablet by mouth nightly.  triamterene (DYRENIUM) 50 mg capsule Take 1 Capsule by mouth two (2) times a day.  flash glucose sensor (FreeStyle Shaunna 2 Sensor) kit Use as directed to check blood sugars 3 times daily.  omeprazole (PRILOSEC) 40 mg capsule Take 1 Capsule by mouth daily as needed for Other (Reflux).  atorvastatin (LIPITOR) 20 mg tablet Take 1 Tablet by mouth daily.  Indications: high cholesterol and high triglycerides    sildenafil citrate (VIAGRA) 100 mg tablet 1 tablet by mouth at least 1 hour before intercourse    ibuprofen (MOTRIN) 400 mg tablet Take  by mouth every six (6) hours as needed for Pain. No current facility-administered medications for this visit. Allergies and Intolerances:   No Known Allergies  Family History:   No family history on file. Social History:   He  reports that he has never smoked. He has never used smokeless tobacco.   Social History     Substance and Sexual Activity   Alcohol Use Yes    Comment: occasional     Immunization History:  Immunization History   Administered Date(s) Administered    COVID-19, Yenni American, Primary or Immunocompromised Series, MRNA, PF, 100mcg/0.5mL 03/31/2021, 04/28/2021    Influenza Vaccine 10/01/2021       Review of Systems:   As above included in HPI. Otherwise 11 point review of systems negative including constitutional, skin, HENT, eyes, respiratory, cardiovascular, gastrointestinal, genitourinary, musculoskeletal, endocrine, hematologic, allergy, and neurologic. Physical:   Visit Vitals  BP (!) 140/68   Pulse 79   Temp 97 °F (36.1 °C) (Temporal)   Resp 16   Ht 5' 8\" (1.727 m)   Wt 231 lb 3.2 oz (104.9 kg)   SpO2 98%   BMI 35.15 kg/m²      Wt Readings from Last 3 Encounters:   11/24/21 231 lb 3.2 oz (104.9 kg)   09/02/21 225 lb 3.2 oz (102.2 kg)   07/27/20 231 lb (104.8 kg)         Exam:   Physical Exam  Vitals and nursing note reviewed. Constitutional:       Appearance: Normal appearance. He is obese. HENT:      Head: Normocephalic and atraumatic. Right Ear: External ear normal.      Left Ear: External ear normal.   Eyes:      Extraocular Movements: Extraocular movements intact. Conjunctiva/sclera: Conjunctivae normal.   Neck:      Vascular: No carotid bruit. Cardiovascular:      Rate and Rhythm: Normal rate and regular rhythm. Pulses: Normal pulses. Heart sounds: Normal heart sounds.       Comments: No edema noted  Pulmonary:      Effort: Pulmonary effort is normal. No respiratory distress. Breath sounds: Normal breath sounds. No wheezing. Musculoskeletal:         General: Normal range of motion. Cervical back: Normal range of motion and neck supple. Comments: Gait stable   Skin:     General: Skin is warm and dry. Neurological:      General: No focal deficit present. Mental Status: He is alert and oriented to person, place, and time. Psychiatric:         Mood and Affect: Mood normal.         Behavior: Behavior normal.         Thought Content: Thought content normal.         Judgment: Judgment normal.       Review of Data:  Labs reviewed: Yes   to 149;  to 77. Continue statin   K+ 3.3. DC HCTZ and initiate triamterene. A1c 10.9 to 6.6      Plan:    ICD-10-CM ICD-9-CM    1. Type 2 diabetes mellitus with hyperglycemia, without long-term current use of insulin (HCC)  E11.65 250.00 metFORMIN (GLUCOPHAGE) 1,000 mg tablet     790.29    2. Mixed hyperlipidemia  E78.2 272.2    3. Primary hypertension  I10 401.9    4. Hypokalemia  K08.4 863.8 METABOLIC PANEL, COMPREHENSIVE   5. Gastroesophageal reflux disease without esophagitis  K21.9 530.81    6. Obesity (BMI 30-39. 9)  E66.9 278.00        -Type 2 diabetes  A1c 10.9 to 6.6 . .. WOW  A1c goal <7  Continue Metformin therapy. Will dc metformin 500mg BID and change to metformin 100mg qpm.   Instructed patient to cut back on carbs and sugary foods  Increase exercise  Encourage weight loss  Increase water intake  Continue to work with Yury Montiel PharmD    -Mixed hyperlipidemia   to 77;  to 149  Continue atorvastatin 20mg daily  Reduce fried fatty or oily foods in diet  Limit red meats and alcohol.     Limit fast food  Work on weight reduction  Increase water intake    Hypertension  Continue Triamterene 50mg BID  Limit sodium in diet to 2g/d  Avoid pork  Initiate cardio exercise  Weight reduction  Increase water intake  Report BP readings greater than 139/89 to office    -Hypokalemia  dc'd HCTZ and initiated Triamterene. CMP ordered for week of 12/13/21    GERD  continue omeprazole daily as needed  Avoid gut irritants such as spicy foods  Avoid laying down for 30 to 45 minutes after eating  Avoid excessive alcohol consumption    Obesity  BMI is out of normal parameters and plan is as follows: Discussed in great detail on diet, portion control, exercise, avoiding foods high in sugar, carbs and starches, fatty/greasy foods and to eat until satisfied not til full. I have counseled this patient on diet and exercise regimens as well. Patient verbalized understanding. Follow up 3 months  Labs needed 1 week prior to appt: No  POC A1c      Dr. Carol Hook, AGNP-C, DNP  [facility]     The total face time was 35 minutes. Greater than 50% of that time was spent in counseling and/or coordination of care. My summary of patient counseling and coordination of care includes Reviewing medical record, assessing patient, placing orders, and discussing plan of care with patient.

## 2021-11-29 PROBLEM — G62.9 NEUROPATHY: Status: ACTIVE | Noted: 2021-11-29

## 2021-11-29 PROBLEM — E78.2 MIXED HYPERLIPIDEMIA: Status: ACTIVE | Noted: 2021-11-29

## 2021-11-30 ENCOUNTER — TELEPHONE (OUTPATIENT)
Dept: INTERNAL MEDICINE CLINIC | Age: 61
End: 2021-11-30

## 2021-11-30 NOTE — TELEPHONE ENCOUNTER
Returned call. Scheduled for follow up in 4-6 weeks. Patient to start keeping a log of his diet so that we can better understand what is causing blood sugar spikes. Continue metformin 1000 mg once daily. Encouraged patient to start incorporating exercise into routine as able. Thank you,  Tricia Patino. SUNI Ingram        For Pharmacy Admin Tracking Only     CPA in place:  Yes   Recommendation Provided To: Patient/Caregiver: 1 via Telephone   Intervention Detail: Scheduled Appointment   Gap Closed?: Yes   Intervention Accepted By: Patient/Caregiver: 1   Time Spent (min): 30

## 2021-11-30 NOTE — TELEPHONE ENCOUNTER
JAELM for patient to return call.     Zohaib Dean [facility] Nurses  Please reach out to patient and ask him to go to [facility] or [facility] lab the week of 12/13/21 to have his CMP rechecked- need to follow up on low potassium since changing medication. Rubi lama

## 2021-12-15 ENCOUNTER — HOSPITAL ENCOUNTER (OUTPATIENT)
Dept: LAB | Age: 61
Discharge: HOME | End: 2021-12-15
Payer: COMMERCIAL

## 2021-12-15 ENCOUNTER — APPOINTMENT (OUTPATIENT)
Dept: INTERNAL MEDICINE CLINIC | Age: 61
End: 2021-12-15

## 2021-12-15 DIAGNOSIS — E87.6 HYPOKALEMIA: ICD-10-CM

## 2021-12-15 LAB
ALBUMIN SERPL-MCNC: 3.9 G/DL (ref 3.4–5)
ALBUMIN/GLOB SERPL: 1.3 {RATIO} (ref 0.8–1.7)
ALP SERPL-CCNC: 70 U/L (ref 45–117)
ALT SERPL-CCNC: 24 U/L (ref 16–61)
ANION GAP SERPL CALC-SCNC: 6 MMOL/L (ref 3–18)
AST SERPL-CCNC: 8 U/L (ref 10–38)
BILIRUB SERPL-MCNC: 0.2 MG/DL (ref 0.2–1)
BUN SERPL-MCNC: 11 MG/DL (ref 7–18)
BUN/CREAT SERPL: 13 (ref 12–20)
CALCIUM SERPL-MCNC: 9.1 MG/DL (ref 8.5–10.1)
CHLORIDE SERPL-SCNC: 108 MMOL/L (ref 100–111)
CO2 SERPL-SCNC: 27 MMOL/L (ref 21–32)
CREAT SERPL-MCNC: 0.83 MG/DL (ref 0.6–1.3)
GLOBULIN SER CALC-MCNC: 3 G/DL (ref 2–4)
GLUCOSE SERPL-MCNC: 148 MG/DL (ref 74–99)
POTASSIUM SERPL-SCNC: 4.1 MMOL/L (ref 3.5–5.5)
PROT SERPL-MCNC: 6.9 G/DL (ref 6.4–8.2)
SODIUM SERPL-SCNC: 141 MMOL/L (ref 136–145)

## 2021-12-15 PROCEDURE — 80053 COMPREHEN METABOLIC PANEL: CPT

## 2021-12-16 ENCOUNTER — TELEPHONE (OUTPATIENT)
Dept: INTERNAL MEDICINE CLINIC | Age: 61
End: 2021-12-16

## 2021-12-16 DIAGNOSIS — I10 PRIMARY HYPERTENSION: Primary | ICD-10-CM

## 2021-12-16 NOTE — PROGRESS NOTES
[address] [facility] nurses, please inform patient his potassium level has returned to normal.  Thank you

## 2021-12-16 NOTE — TELEPHONE ENCOUNTER
----- Message from Nat Ford DNP sent at 12/16/2021  9:53 AM EST -----  [facility] nurses, please inform patient his potassium level has returned to normal.  Thank you

## 2021-12-16 NOTE — TELEPHONE ENCOUNTER
Patient is aware of normal potassium. Patient wanted to know if there is a cheaper medication for his blood pressure.

## 2021-12-19 RX ORDER — SPIRONOLACTONE 25 MG/1
25 TABLET ORAL DAILY
Qty: 90 TABLET | Refills: 0 | Status: SHIPPED | OUTPATIENT
Start: 2021-12-19 | End: 2022-02-16 | Stop reason: SDUPTHER

## 2021-12-20 ENCOUNTER — TELEPHONE (OUTPATIENT)
Dept: INTERNAL MEDICINE CLINIC | Age: 61
End: 2021-12-20

## 2021-12-20 NOTE — TELEPHONE ENCOUNTER
Called and spoke with patient he has been vaccinated and does not have any symptoms and has tested negative. Explained to patient since he is vaccinated and had a negative test he does not need to quarantine. Advised him to reach out to the specialist office to see if they have different guidelines. No further questions or concerns at this time.

## 2021-12-20 NOTE — TELEPHONE ENCOUNTER
Patient said he has been in contact  With someone who tested positive for covid , on 12/17- pt took covid test on 12/17 it came up negative and   does not have any symptoms , he said he is trying to sched appt with a specialist and is asking ,does he have to wait ,and if so how long  Please advise

## 2021-12-21 NOTE — TELEPHONE ENCOUNTER
Patient is aware of message. Patient wanted to know if the new BP medication will be effective if his BP readings are as follows:    12/20/2021  157/86  160/81    12/9/2021                     128/73    12/6/2021  116/69    12/03/2021  128/73    11/30/2021   149/77    11/29/2021  158/84    11/28/2021  165/87    11/27/2021  173/96      Patient states he was told his BP should be under 139/89. Please advise.

## 2021-12-21 NOTE — TELEPHONE ENCOUNTER
Have patient continue Aldactone since he has only been on this med for 1-2 days. He needs to monitor is BP 2 hours after taking BP meds and record. He needs to call results to the office in 2 weeks.  Thank you

## 2021-12-22 ENCOUNTER — TELEPHONE (OUTPATIENT)
Dept: INTERNAL MEDICINE CLINIC | Age: 61
End: 2021-12-22

## 2021-12-22 NOTE — TELEPHONE ENCOUNTER
Patient stated he has been on Triamterene and has not started Aldactone. Flory that was not included in previous message.       Patient wanted to know if aldactone alone will get his blood pressure to goal.

## 2021-12-22 NOTE — TELEPHONE ENCOUNTER
Thank you for the update. Due to the cost of his medication he is not going to be able to afford to continue the triamterene therapy. He can finish out the triamterene and then start the Aldactone. Yes this should manage his blood pressure.   Thank you

## 2021-12-29 DIAGNOSIS — E78.2 MIXED HYPERLIPIDEMIA: ICD-10-CM

## 2021-12-30 RX ORDER — ATORVASTATIN CALCIUM 20 MG/1
TABLET, FILM COATED ORAL
Qty: 90 TABLET | Refills: 1 | OUTPATIENT
Start: 2021-12-30

## 2021-12-31 DIAGNOSIS — K21.9 GASTROESOPHAGEAL REFLUX DISEASE WITHOUT ESOPHAGITIS: ICD-10-CM

## 2022-01-03 RX ORDER — OMEPRAZOLE 40 MG/1
40 CAPSULE, DELAYED RELEASE ORAL
Qty: 90 CAPSULE | Refills: 0 | Status: SHIPPED | OUTPATIENT
Start: 2022-01-03 | End: 2022-02-16 | Stop reason: SDUPTHER

## 2022-01-03 NOTE — TELEPHONE ENCOUNTER
PCP: Graham Salguero DNP    Last appt: 12/15/2021  Future Appointments   Date Time Provider Moody Mayuri   1/5/2022  3:00 PM Hector Joseph [place] [facility] BS AMB   2/16/2022  2:15 PM Marge Coyle DNP IO BS AMB       Requested Prescriptions     Pending Prescriptions Disp Refills    omeprazole (PRILOSEC) 40 mg capsule 90 Capsule 0     Sig: Take 1 Capsule by mouth daily as needed for PRN Reason (Other) (Reflux).

## 2022-02-16 ENCOUNTER — OFFICE VISIT (OUTPATIENT)
Dept: INTERNAL MEDICINE CLINIC | Age: 62
End: 2022-02-16
Payer: COMMERCIAL

## 2022-02-16 VITALS
WEIGHT: 228.8 LBS | DIASTOLIC BLOOD PRESSURE: 78 MMHG | BODY MASS INDEX: 34.68 KG/M2 | TEMPERATURE: 97.5 F | HEIGHT: 68 IN | SYSTOLIC BLOOD PRESSURE: 141 MMHG | OXYGEN SATURATION: 98 % | HEART RATE: 68 BPM | RESPIRATION RATE: 16 BRPM

## 2022-02-16 DIAGNOSIS — E11.65 TYPE 2 DIABETES MELLITUS WITH HYPERGLYCEMIA, WITHOUT LONG-TERM CURRENT USE OF INSULIN (HCC): Primary | ICD-10-CM

## 2022-02-16 DIAGNOSIS — N50.89 SWELLING OF LEFT TESTICLE: ICD-10-CM

## 2022-02-16 DIAGNOSIS — I10 PRIMARY HYPERTENSION: ICD-10-CM

## 2022-02-16 DIAGNOSIS — K21.9 GASTROESOPHAGEAL REFLUX DISEASE WITHOUT ESOPHAGITIS: ICD-10-CM

## 2022-02-16 DIAGNOSIS — E87.6 HYPOKALEMIA: ICD-10-CM

## 2022-02-16 DIAGNOSIS — E78.2 MIXED HYPERLIPIDEMIA: ICD-10-CM

## 2022-02-16 DIAGNOSIS — G47.33 OSA (OBSTRUCTIVE SLEEP APNEA): ICD-10-CM

## 2022-02-16 LAB — HBA1C MFR BLD HPLC: 6.3 %

## 2022-02-16 PROCEDURE — 99214 OFFICE O/P EST MOD 30 MIN: CPT | Performed by: NURSE PRACTITIONER

## 2022-02-16 PROCEDURE — 83036 HEMOGLOBIN GLYCOSYLATED A1C: CPT | Performed by: NURSE PRACTITIONER

## 2022-02-16 RX ORDER — OMEPRAZOLE 40 MG/1
40 CAPSULE, DELAYED RELEASE ORAL
Qty: 90 CAPSULE | Refills: 1 | Status: SHIPPED | OUTPATIENT
Start: 2022-02-16 | End: 2022-03-06

## 2022-02-16 RX ORDER — LISINOPRIL 20 MG/1
20 TABLET ORAL DAILY
Qty: 90 TABLET | Refills: 1 | Status: SHIPPED | OUTPATIENT
Start: 2022-02-16 | End: 2022-08-10 | Stop reason: SDUPTHER

## 2022-02-16 RX ORDER — ATORVASTATIN CALCIUM 20 MG/1
20 TABLET, FILM COATED ORAL DAILY
Qty: 90 TABLET | Refills: 1 | Status: SHIPPED | OUTPATIENT
Start: 2022-02-16 | End: 2022-08-10 | Stop reason: SDUPTHER

## 2022-02-16 RX ORDER — SPIRONOLACTONE 25 MG/1
25 TABLET ORAL DAILY
Qty: 90 TABLET | Refills: 1 | Status: SHIPPED | OUTPATIENT
Start: 2022-02-16 | End: 2022-08-10 | Stop reason: SDUPTHER

## 2022-02-16 NOTE — PROGRESS NOTES
Chief Complaint   Patient presents with    Follow-up     3 months     1. \"Have you been to the ER, urgent care clinic since your last visit? Hospitalized since your last visit? \" No    2. \"Have you seen or consulted any other health care providers outside of the [address] [place] [address] since your last visit? \" No     3. For patients aged 39-70: Has the patient had a colonoscopy / FIT/ Cologuard? Yes - no Care Gap present      If the patient is female:    4. For patients aged 41-77: Has the patient had a mammogram within the past 2 years? NA - based on age or sex      11. For patients aged 21-65: Has the patient had a pap smear?  NA - based on age or sex

## 2022-02-16 NOTE — PROGRESS NOTES
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 He is single. Past Medical History:   Diagnosis Date    Basal cell carcinoma     being treated.  Gastrointestinal disorder     GERD (gastroesophageal reflux disease)     Hypertension     Sleep apnea     uses CPAP     Past Surgical History:   Procedure Laterality Date    HX HEENT      Ear Tubes    HX LYMPHADENECTOMY      left axilla    HX MOHS PROCEDURES      nose    HX OTHER SURGICAL      Melanoma removed on back      Current Outpatient Medications   Medication Sig    ACETAMINOPHEN PO Take  by mouth.  lisinopriL (PRINIVIL, ZESTRIL) 20 mg tablet Take 1 Tablet by mouth daily. For blood pressure    atorvastatin (LIPITOR) 20 mg tablet Take 1 Tablet by mouth daily. Indications: high cholesterol and high triglycerides    omeprazole (PRILOSEC) 40 mg capsule Take 1 Capsule by mouth daily as needed for PRN Reason (Other) (Reflux).  spironolactone (ALDACTONE) 25 mg tablet Take 1 Tablet by mouth daily.  metFORMIN (GLUCOPHAGE) 1,000 mg tablet Take 1 Tablet by mouth nightly.  flash glucose sensor (FreeStyle Shaunna 2 Sensor) kit Use as directed to check blood sugars 3 times daily.  sildenafil citrate (VIAGRA) 100 mg tablet 1 tablet by mouth at least 1 hour before intercourse     No current facility-administered medications for this visit. Allergies and Intolerances:   No Known Allergies  Family History:   No family history on file. Social History:   He  reports that he has never smoked.  He has never used smokeless tobacco.   Social History     Substance and Sexual Activity   Alcohol Use Yes    Comment: occasional     Immunization History:  Immunization History   Administered Date(s) Administered    Katherine HANLEY Booster, PF, 0.25mL Dose 12/03/2021    COVID-19, Moderna, Primary or Immunocompromised Series, MRNA, PF, 100mcg/0.5mL 03/31/2021, 04/28/2021    Influenza Vaccine 10/01/2021     Todays concern:  1. Diabetes. Most the time he eats pretty healthy but on occasion he will eat something he should not. He tries to keep that in moderation. He does not believe he drinks enough water but will work on this. Checking his blood sugar is getting expensive as the kits are $75 a month. When he checks his blood sugars they are averaging 118-183 with the occasional outlier in the low 200s. He continues his Metformin without side effects. 2.  Hypertension. He does not check his blood pressure at home. He is taking his spironolactone at nighttime for convenience purposes. He is tolerating therapy well. 3.  Cholesterol taking atorvastatin at nighttime. Denies myalgias. 4.  BHARAT. Seeing Dr. Yenni Alexis. Restarted CPAP therapy. Tolerating well. 5.  Testicle swelling. Occurred in January 2022. Noted his left testicle to be tender to touch and slightly swollen. This resolved on its own. He has not been sexually active in quite some time. He will be reaching out to a neurologist for follow-up purposes. GERD: Continues omeprazole as needed for symptoms. He does avoid foods that may irritate his gut. Review of Systems:   As above included in HPI. Otherwise 11 point review of systems negative including constitutional, skin, HENT, eyes, respiratory, cardiovascular, gastrointestinal, genitourinary, musculoskeletal, endocrine, hematologic, allergy, and neurologic.       Physical:   Visit Vitals  BP (!) 141/78   Pulse 68   Temp 97.5 °F (36.4 °C) (Temporal)   Resp 16   Ht 5' 8\" (1.727 m)   Wt 228 lb 12.8 oz (103.8 kg)   SpO2 98%   BMI 34.79 kg/m²      Wt Readings from Last 3 Encounters:   02/16/22 228 lb 12.8 oz (103.8 kg)   11/24/21 231 lb 3.2 oz (104.9 kg)   09/02/21 225 lb 3.2 oz (102.2 kg)         Exam:   Physical Exam  Vitals and nursing note reviewed. Constitutional:       Appearance: Normal appearance. He is obese. Comments: Morbidly     HENT:      Head: Normocephalic and atraumatic. Right Ear: External ear normal.      Left Ear: External ear normal.   Eyes:      Extraocular Movements: Extraocular movements intact. Conjunctiva/sclera: Conjunctivae normal.   Neck:      Vascular: No carotid bruit. Cardiovascular:      Rate and Rhythm: Normal rate and regular rhythm. Pulses: Normal pulses. Heart sounds: Normal heart sounds. Comments: No edema noted  Pulmonary:      Effort: Pulmonary effort is normal. No respiratory distress. Breath sounds: Normal breath sounds. No wheezing. Musculoskeletal:         General: Normal range of motion. Cervical back: Normal range of motion and neck supple. Comments: Gait stable   Skin:     General: Skin is warm and dry. Neurological:      General: No focal deficit present. Mental Status: He is alert and oriented to person, place, and time. Psychiatric:         Mood and Affect: Mood normal.         Behavior: Behavior normal.       Review of Data:  POC A1c 6.3    Plan:    ICD-10-CM ICD-9-CM    1. Type 2 diabetes mellitus with hyperglycemia, without long-term current use of insulin (HCC)  E11.65 250.00 AMB POC HEMOGLOBIN A1C     790.29    2. Primary hypertension  I10 401.9 lisinopriL (PRINIVIL, ZESTRIL) 20 mg tablet      spironolactone (ALDACTONE) 25 mg tablet   3. Hypokalemia  E87.6 276.8    4. Mixed hyperlipidemia  E78.2 272.2 atorvastatin (LIPITOR) 20 mg tablet   5. Gastroesophageal reflux disease without esophagitis  K21.9 530.81 omeprazole (PRILOSEC) 40 mg capsule   6. BHARAT (obstructive sleep apnea)  G47.33 327.23    7. Swelling of left testicle  N50.89 608.86        -Type 2 diabetes  POC A1c 6.6-6.3. He is doing very well with managing his diabetes  A1c goal <7  Continue Metformin 1000 mg daily.   Initiated Lisinopril for kidney protection  Instructed patient to cut back on carbs and sugary foods  Instructed patient to treat himself once a week  Increase exercise  Encourage weight loss  Increase water intake    -Hypertension  continue, Spironolactone 25 mg daily   Initiate lisinopril 20 mg daily (hypertension and kidney protection)  Instructed patient on possible side effects of medication  Discussed with patient the importance of taking medication in the morning as this is when he is most active. Limit sodium in diet to 2g/d  Avoid pork  Initiate cardio exercise  Weight reduction  Increase water intake  Check BP and report readings greater than 139/89 to office    -Hypokalemia  DC'd HCTZ 12/2021  Continue Aldactone therapy    -Mixed hyperlipidemia  12/2021  to 77;  to 149  Continue atorvastatin 20mg daily  Reduce fried fatty or oily foods in diet  Limit red meats and alcohol. Limit fast food  Work on weight reduction  Increase water intake    -GERD  continue omeprazole daily as needed  Avoid gut irritants such as spicy foods  Avoid laying down for 30 to 45 minutes after eating  Avoid excessive alcohol consumption    -BHARAT  Continue CPAP therapy  Specialist managed condition is being evaluated/managed by Dr. Derrick Webb. No acute findings today meriting change in management plan. -Swelling of Left testicle  One time occurrence. Resolved without intervention.      -Obesity  BMI is out of normal parameters and plan is as follows: Discussed in great detail on diet, portion control, exercise, avoiding foods high in sugar, carbs and starches, fatty/greasy foods and to eat until satisfied not til full. I have counseled this patient on diet and exercise regimens as well. Patient verbalized understanding. Follow up 6 months POC A1c      Dr. Jake Archibald, AGNP-C, DNP  [facility]       The total time 35 minutes. At least 50% of that time was spent in counseling and/or coordination of care.  My summary of patient counseling and coordination of care includes reviewing medical record, assessing patient, placing orders, and discussing plan of care with patient.  Prior to seeing this patient, I reviewed records, including previously completed appointments/records, reviewed specialty records in [facility], and updated visits from other providers since I saw the patient last.

## 2022-03-05 DIAGNOSIS — E78.2 MIXED HYPERLIPIDEMIA: ICD-10-CM

## 2022-03-06 DIAGNOSIS — K21.9 GASTROESOPHAGEAL REFLUX DISEASE WITHOUT ESOPHAGITIS: ICD-10-CM

## 2022-03-06 RX ORDER — ATORVASTATIN CALCIUM 20 MG/1
TABLET, FILM COATED ORAL
Qty: 90 TABLET | Refills: 1 | OUTPATIENT
Start: 2022-03-06

## 2022-03-06 RX ORDER — OMEPRAZOLE 40 MG/1
40 CAPSULE, DELAYED RELEASE ORAL
Qty: 90 CAPSULE | Refills: 1 | Status: SHIPPED | OUTPATIENT
Start: 2022-03-06 | End: 2022-08-10 | Stop reason: SDUPTHER

## 2022-03-06 NOTE — TELEPHONE ENCOUNTER
Requested Prescriptions     Refused Prescriptions Disp Refills    atorvastatin (LIPITOR) 20 mg tablet [Pharmacy Med Name: ATORVASTATIN 20MG TABLETS] 90 Tablet 1     Sig: TAKE 1 TABLET BY MOUTH DAILY FOR HIGH CHOLESTEROL     Refused By: Luan Fernandez     Reason for Refusal: Refill not appropriate         2/16/22 RX 90 tabs plus 1 refill

## 2022-03-18 PROBLEM — E66.9 OBESITY (BMI 30-39.9): Status: ACTIVE | Noted: 2019-05-10

## 2022-03-18 PROBLEM — E78.2 MIXED HYPERLIPIDEMIA: Status: ACTIVE | Noted: 2021-11-29

## 2022-03-19 PROBLEM — G47.33 OBSTRUCTIVE SLEEP APNEA: Status: ACTIVE | Noted: 2019-03-06

## 2022-03-19 PROBLEM — R73.01 IMPAIRED FASTING GLUCOSE: Status: ACTIVE | Noted: 2019-05-10

## 2022-03-19 PROBLEM — I10 PRIMARY HYPERTENSION: Status: ACTIVE | Noted: 2019-03-06

## 2022-03-19 PROBLEM — N52.01 ERECTILE DYSFUNCTION DUE TO ARTERIAL INSUFFICIENCY: Status: ACTIVE | Noted: 2019-03-06

## 2022-03-19 PROBLEM — E87.6 HYPOKALEMIA: Status: ACTIVE | Noted: 2021-11-21

## 2022-03-20 PROBLEM — G62.9 NEUROPATHY: Status: ACTIVE | Noted: 2021-11-29

## 2022-03-22 DIAGNOSIS — I10 PRIMARY HYPERTENSION: Primary | ICD-10-CM

## 2022-03-22 RX ORDER — AMLODIPINE BESYLATE 10 MG/1
10 TABLET ORAL DAILY
Qty: 90 TABLET | Refills: 1 | Status: SHIPPED | OUTPATIENT
Start: 2022-03-22 | End: 2022-08-10 | Stop reason: SDUPTHER

## 2022-06-06 DIAGNOSIS — E11.65 TYPE 2 DIABETES MELLITUS WITH HYPERGLYCEMIA, WITHOUT LONG-TERM CURRENT USE OF INSULIN (HCC): ICD-10-CM

## 2022-06-06 RX ORDER — METFORMIN HYDROCHLORIDE 1000 MG/1
TABLET ORAL
Qty: 90 TABLET | Refills: 0 | Status: SHIPPED | OUTPATIENT
Start: 2022-06-06 | End: 2022-08-10 | Stop reason: SDUPTHER

## 2022-06-06 NOTE — TELEPHONE ENCOUNTER
Requested Prescriptions     Signed Prescriptions Disp Refills    metFORMIN (GLUCOPHAGE) 1,000 mg tablet 90 Tablet 0     Sig: TAKE 1 TABLET BY MOUTH EVERY NIGHT     Authorizing Provider: Ce Cardenas

## 2022-08-07 DIAGNOSIS — I10 PRIMARY HYPERTENSION: ICD-10-CM

## 2022-08-07 RX ORDER — LISINOPRIL 20 MG/1
20 TABLET ORAL DAILY
Qty: 90 TABLET | Refills: 1 | OUTPATIENT
Start: 2022-08-07

## 2022-08-10 ENCOUNTER — OFFICE VISIT (OUTPATIENT)
Dept: INTERNAL MEDICINE CLINIC | Age: 62
End: 2022-08-10
Payer: COMMERCIAL

## 2022-08-10 VITALS
HEART RATE: 66 BPM | SYSTOLIC BLOOD PRESSURE: 131 MMHG | OXYGEN SATURATION: 99 % | BODY MASS INDEX: 35.92 KG/M2 | DIASTOLIC BLOOD PRESSURE: 73 MMHG | RESPIRATION RATE: 18 BRPM | HEIGHT: 68 IN | TEMPERATURE: 96.6 F | WEIGHT: 237 LBS

## 2022-08-10 DIAGNOSIS — E78.2 MIXED HYPERLIPIDEMIA: ICD-10-CM

## 2022-08-10 DIAGNOSIS — E11.65 UNCONTROLLED TYPE 2 DIABETES MELLITUS WITH HYPERGLYCEMIA (HCC): Primary | ICD-10-CM

## 2022-08-10 DIAGNOSIS — E66.9 OBESITY (BMI 30-39.9): ICD-10-CM

## 2022-08-10 DIAGNOSIS — I10 PRIMARY HYPERTENSION: ICD-10-CM

## 2022-08-10 DIAGNOSIS — G47.33 OBSTRUCTIVE SLEEP APNEA: ICD-10-CM

## 2022-08-10 DIAGNOSIS — R63.5 WEIGHT GAIN: ICD-10-CM

## 2022-08-10 DIAGNOSIS — R41.3 MEMORY PROBLEM: ICD-10-CM

## 2022-08-10 DIAGNOSIS — K21.9 GASTROESOPHAGEAL REFLUX DISEASE WITHOUT ESOPHAGITIS: ICD-10-CM

## 2022-08-10 LAB — HBA1C MFR BLD HPLC: 7.2 %

## 2022-08-10 PROCEDURE — 3051F HG A1C>EQUAL 7.0%<8.0%: CPT | Performed by: NURSE PRACTITIONER

## 2022-08-10 PROCEDURE — 83036 HEMOGLOBIN GLYCOSYLATED A1C: CPT | Performed by: NURSE PRACTITIONER

## 2022-08-10 PROCEDURE — 99214 OFFICE O/P EST MOD 30 MIN: CPT | Performed by: NURSE PRACTITIONER

## 2022-08-10 RX ORDER — METFORMIN HYDROCHLORIDE 1000 MG/1
1000 TABLET ORAL
Qty: 90 TABLET | Refills: 1 | Status: SHIPPED | OUTPATIENT
Start: 2022-08-10

## 2022-08-10 RX ORDER — ATORVASTATIN CALCIUM 20 MG/1
20 TABLET, FILM COATED ORAL DAILY
Qty: 90 TABLET | Refills: 1 | Status: SHIPPED | OUTPATIENT
Start: 2022-08-10

## 2022-08-10 RX ORDER — AMLODIPINE BESYLATE 10 MG/1
10 TABLET ORAL DAILY
Qty: 90 TABLET | Refills: 1 | Status: SHIPPED | OUTPATIENT
Start: 2022-08-10

## 2022-08-10 RX ORDER — LISINOPRIL 20 MG/1
20 TABLET ORAL DAILY
Qty: 90 TABLET | Refills: 1 | Status: SHIPPED | OUTPATIENT
Start: 2022-08-10 | End: 2022-09-12 | Stop reason: SINTOL

## 2022-08-10 RX ORDER — SPIRONOLACTONE 25 MG/1
25 TABLET ORAL DAILY
Qty: 90 TABLET | Refills: 1 | Status: SHIPPED | OUTPATIENT
Start: 2022-08-10

## 2022-08-10 RX ORDER — OMEPRAZOLE 40 MG/1
40 CAPSULE, DELAYED RELEASE ORAL
Qty: 90 CAPSULE | Refills: 1 | Status: SHIPPED | OUTPATIENT
Start: 2022-08-10 | End: 2022-08-31 | Stop reason: SDUPTHER

## 2022-08-10 NOTE — PROGRESS NOTES
[facility] [id number] [facility], [address]  [phone] LEONEL/[phone] fax    8/10/2022    Piedad Packer 1960 is a pleasant WHITE/NON- male. Past Medical History:   Diagnosis Date    Erectile dysfunction due to arterial insufficiency 3/6/2019    GERD (gastroesophageal reflux disease)     Mixed hyperlipidemia 11/29/2021    BHARAT on CPAP     Primary hypertension 3/6/2019    Uncontrolled type 2 diabetes mellitus with hyperglycemia ([facility] [address] 8/15/2022     Past Surgical History:   Procedure Laterality Date    HX HEENT      Ear Tubes    HX LYMPHADENECTOMY      left axilla    HX MOHS PROCEDURES      nose    HX OTHER SURGICAL      Melanoma removed on back      Current Outpatient Medications   Medication Sig    amLODIPine (NORVASC) 10 mg tablet Take 1 Tablet by mouth in the morning. omeprazole (PRILOSEC) 40 mg capsule Take 1 Capsule by mouth daily as needed for PRN Reason (Other) (reflux). lisinopriL (PRINIVIL, ZESTRIL) 20 mg tablet Take 1 Tablet by mouth in the morning. For blood pressure    atorvastatin (LIPITOR) 20 mg tablet Take 1 Tablet by mouth in the morning. Indications: high cholesterol and high triglycerides    spironolactone (ALDACTONE) 25 mg tablet Take 1 Tablet by mouth in the morning. metFORMIN (GLUCOPHAGE) 1,000 mg tablet Take 1 Tablet by mouth nightly. ACETAMINOPHEN PO Take  by mouth. flash glucose sensor (FreeStyle Shaunna 2 Sensor) kit Use as directed to check blood sugars 3 times daily. (Patient not taking: Reported on 8/10/2022)    sildenafil citrate (VIAGRA) 100 mg tablet 1 tablet by mouth at least 1 hour before intercourse (Patient not taking: Reported on 8/10/2022)     No current facility-administered medications for this visit. Allergies and Intolerances:   No Known Allergies  Family History:   History reviewed. No pertinent family history. Social History:   He  reports that he has never smoked.  He has never used smokeless tobacco.   Social History     Substance and Sexual Activity   Alcohol Use Yes    Comment: occasional     Immunization History:  Immunization History   Administered Date(s) Administered    COVID-19, MODERNA BLUE border, Primary or Immunocompromised, (age 18y+), IM, 100 mcg/0.5mL 03/31/2021, 04/28/2021    COVID-19, MODERNA Booster BLUE border, (age 18y+), IM, 50mcg/0.25mL 12/03/2021    Influenza Vaccine 10/01/2021       Todays concerns/HPI:  Memory decline. Losing keys, forgetting small stuff. Declines getting lost in familiar areas. Seeking opinion on the use of Prevagen. Not seeking Neuropsych referral at this time. Diabetes. Not checking BS due to cost of [employer] sensors. CPAP. Using nightly. Notices allergies flare along with phlegm at times. Does not use a cleaning device such as SoClean. Is not compliant with cleaning equipment as recommended. Review of Systems:  Pertinent items are noted in HPI. Review of Data:  POC A1c 6.3-7.2    Physical:   Visit Vitals  /73   Pulse 66   Temp (!) 96.6 °F (35.9 °C) (Temporal)   Resp 18   Ht 5' 8\" (1.727 m)   Wt 237 lb (107.5 kg)   SpO2 99%   BMI 36.04 kg/m²      Wt Readings from Last 3 Encounters:   08/10/22 237 lb (107.5 kg)   02/16/22 228 lb 12.8 oz (103.8 kg)   11/24/21 231 lb 3.2 oz (104.9 kg)       Exam:   Physical Exam  Constitutional:       Appearance: Normal appearance. He is obese. Comments: Morbidly    HENT:      Head: Normocephalic and atraumatic. Neck:      Vascular: No carotid bruit. Cardiovascular:      Rate and Rhythm: Normal rate and regular rhythm. Pulmonary:      Effort: Pulmonary effort is normal. No respiratory distress. Breath sounds: Normal breath sounds. No wheezing. Musculoskeletal:         General: Normal range of motion. Cervical back: Neck supple. Skin:     General: Skin is warm and dry. Neurological:      Mental Status: He is alert and oriented to person, place, and time.    Psychiatric:         Mood and Affect: Mood normal.      Body mass index is 36.04 kg/m². Plan:    1. Uncontrolled type 2 diabetes mellitus with hyperglycemia (HCC)  POC A1c 6.3-7.2  Not controlled  Continue Metformin 1000mg qpm  Strongly encouraged pt to lose weight as this will help lower A1c. Discussed proper diet and exercise. Encouraged pt to utilize glucometer to help monitor his BS d/t unable to afford [employer] sensors. .     If A1c remains above 7 in 3m, will initiate Metformin BID or add Januvia to med regimen. - AMB POC HEMOGLOBIN A1C  - metFORMIN (GLUCOPHAGE) 1,000 mg tablet; Take 1 Tablet by mouth nightly. Dispense: 90 Tablet; Refill: 1    2. Primary hypertension  BP good  No chg in therapy    - amLODIPine (NORVASC) 10 mg tablet; Take 1 Tablet by mouth in the morning. Dispense: 90 Tablet; Refill: 1  - lisinopriL (PRINIVIL, ZESTRIL) 20 mg tablet; Take 1 Tablet by mouth in the morning. For blood pressure  Dispense: 90 Tablet; Refill: 1  - spironolactone (ALDACTONE) 25 mg tablet; Take 1 Tablet by mouth in the morning. Dispense: 90 Tablet; Refill: 1    3. Mixed hyperlipidemia  11/2021 ; LDL 77  Recheck levels in 6m    - atorvastatin (LIPITOR) 20 mg tablet; Take 1 Tablet by mouth in the morning. Indications: high cholesterol and high triglycerides  Dispense: 90 Tablet; Refill: 1    4. Gastroesophageal reflux disease without esophagitis    - omeprazole (PRILOSEC) 40 mg capsule; Take 1 Capsule by mouth daily as needed for PRN Reason (Other) (reflux). Dispense: 90 Capsule; Refill: 1    5. Memory problem  Discussed Prevagen therapy  Pt declines neuropsych consult at this time    6. Weight gain  9lb weight gain in 6m  BMI is out of normal parameters and plan is as follows: Discussed in great detail on diet, portion control, exercise, avoiding foods high in sugar, carbs and starches, fatty/greasy foods and to eat until satisfied not til full. I have counseled this patient on diet and exercise regimens as well.       7. Obesity (BMI 30-39.9)    8. 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 Reviewed medication and completed medication reconciliation with the patient. Reviewed side effects of medications with the patient. Questions were answered and patient verb understanding.       Follow up 6m w/ labs (CMP, microalbumin, A1c, Lipid)      Dr. Be Stevens, AGNP-C, DNP  [facility]

## 2022-08-10 NOTE — PROGRESS NOTES
Chief Complaint   Patient presents with    Hypertension     6 month f/u    Diabetes     1. \"Have you been to the ER, urgent care clinic since your last visit? Hospitalized since your last visit? \" No    2. \"Have you seen or consulted any other health care providers outside of the [address] [place] [address] since your last visit? \" No     3. For patients aged 39-70: Has the patient had a colonoscopy / FIT/ Cologuard? Yes - no Care Gap present      If the patient is female:    4. For patients aged 41-77: Has the patient had a mammogram within the past 2 years? NA - based on age or sex      11. For patients aged 21-65: Has the patient had a pap smear?  NA - based on age or sex

## 2022-08-15 PROBLEM — Z99.89 OSA ON CPAP: Status: ACTIVE | Noted: 2019-03-06

## 2022-08-15 PROBLEM — E11.65 UNCONTROLLED TYPE 2 DIABETES MELLITUS WITH HYPERGLYCEMIA (HCC): Status: ACTIVE | Noted: 2022-08-15

## 2022-08-15 PROBLEM — G47.33 OSA ON CPAP: Status: ACTIVE | Noted: 2019-03-06

## 2022-08-31 DIAGNOSIS — K21.9 GASTROESOPHAGEAL REFLUX DISEASE WITHOUT ESOPHAGITIS: ICD-10-CM

## 2022-08-31 RX ORDER — OMEPRAZOLE 40 MG/1
40 CAPSULE, DELAYED RELEASE ORAL
Qty: 90 CAPSULE | Refills: 1 | Status: SHIPPED | OUTPATIENT
Start: 2022-08-31

## 2022-08-31 NOTE — TELEPHONE ENCOUNTER
Jayme Katieranjit is requesting a 90 day supply  Previous Rx was sent to [place]    Last Visit: 8/10/22 with Dr. Higgins Congress  Next Appointment: 2/8/23 with Dr. Higgins Congress    Requested Prescriptions     Pending Prescriptions Disp Refills    omeprazole (PRILOSEC) 40 mg capsule 90 Capsule 1     Sig: Take 1 Capsule by mouth daily as needed for PRN Reason (Other) (reflux). For [id number] [facility] in place:   Recommendation Provided To:    Intervention Detail: New Rx: 1, reason: Patient Preference  Gap Closed?:   Intervention Accepted By:   Time Spent (min): 5

## 2022-09-08 DIAGNOSIS — E78.2 MIXED HYPERLIPIDEMIA: ICD-10-CM

## 2022-09-09 DIAGNOSIS — I10 PRIMARY HYPERTENSION: ICD-10-CM

## 2022-09-09 DIAGNOSIS — E11.65 UNCONTROLLED TYPE 2 DIABETES MELLITUS WITH HYPERGLYCEMIA (HCC): ICD-10-CM

## 2022-09-09 RX ORDER — METFORMIN HYDROCHLORIDE 1000 MG/1
TABLET ORAL
Qty: 90 TABLET | Refills: 1 | OUTPATIENT
Start: 2022-09-09

## 2022-09-09 RX ORDER — AMLODIPINE BESYLATE 10 MG/1
TABLET ORAL
Qty: 90 TABLET | Refills: 1 | OUTPATIENT
Start: 2022-09-09

## 2022-09-09 RX ORDER — ATORVASTATIN CALCIUM 20 MG/1
TABLET, FILM COATED ORAL
Qty: 90 TABLET | Refills: 1 | OUTPATIENT
Start: 2022-09-09

## 2022-09-09 NOTE — TELEPHONE ENCOUNTER
Requested Prescriptions     Refused Prescriptions Disp Refills    atorvastatin (LIPITOR) 20 mg tablet [Pharmacy Med Name: ATORVASTATIN 20MG TABLETS] 90 Tablet 1     Sig: TAKE 1 TABLET BY MOUTH DAILY FOR HIGH CHOLESTEROL ABDOMINAL HIGH TRIGLYCERIDES     Refused By: Michelle Batres     Reason for Refusal: Refill not appropriate

## 2022-09-09 NOTE — TELEPHONE ENCOUNTER
90 d/d +1 refill given 8/10/22    Requested Prescriptions     Refused Prescriptions Disp Refills    metFORMIN (GLUCOPHAGE) 1,000 mg tablet [Pharmacy Med Name: METFORMIN 1000MG TABLETS] 90 Tablet 1     Sig: TAKE 1 TABLET BY MOUTH EVERY NIGHT     Refused By: Carin Andrade     Reason for Refusal: Refill not appropriate    amLODIPine (NORVASC) 10 mg tablet [Pharmacy Med Name: AMLODIPINE BESYLATE 10MG TABLETS] 90 Tablet 1     Sig: TAKE 1 TABLET BY MOUTH DAILY     Refused By: Carin Andrade     Reason for Refusal: Refill not appropriate

## 2022-09-12 DIAGNOSIS — I10 PRIMARY HYPERTENSION: Primary | ICD-10-CM

## 2022-09-12 RX ORDER — LOSARTAN POTASSIUM 25 MG/1
25 TABLET ORAL DAILY
Qty: 90 TABLET | Refills: 1 | Status: SHIPPED | OUTPATIENT
Start: 2022-09-12

## 2023-02-01 ENCOUNTER — HOSPITAL ENCOUNTER (OUTPATIENT)
Dept: LAB | Age: 63
Discharge: HOME | End: 2023-02-01
Payer: COMMERCIAL

## 2023-02-01 ENCOUNTER — APPOINTMENT (OUTPATIENT)
Dept: INTERNAL MEDICINE CLINIC | Age: 63
End: 2023-02-01

## 2023-02-01 DIAGNOSIS — E78.5 DYSLIPIDEMIA: ICD-10-CM

## 2023-02-01 DIAGNOSIS — R73.09 ELEVATED HEMOGLOBIN A1C: ICD-10-CM

## 2023-02-01 DIAGNOSIS — I10 PRIMARY HYPERTENSION: ICD-10-CM

## 2023-02-01 DIAGNOSIS — I10 PRIMARY HYPERTENSION: Primary | ICD-10-CM

## 2023-02-01 DIAGNOSIS — E78.2 MIXED HYPERLIPIDEMIA: ICD-10-CM

## 2023-02-01 LAB
ALBUMIN SERPL-MCNC: 4.7 G/DL (ref 3.4–5)
ALBUMIN/GLOB SERPL: 1.5 (ref 0.8–1.7)
ALP SERPL-CCNC: 84 U/L (ref 45–117)
ALT SERPL-CCNC: 27 U/L (ref 16–61)
ANION GAP SERPL CALC-SCNC: 6 MMOL/L (ref 3–18)
AST SERPL-CCNC: 12 U/L (ref 10–38)
BILIRUB SERPL-MCNC: 0.5 MG/DL (ref 0.2–1)
BUN SERPL-MCNC: 9 MG/DL (ref 7–18)
BUN/CREAT SERPL: 12 (ref 12–20)
CALCIUM SERPL-MCNC: 9.4 MG/DL (ref 8.5–10.1)
CHLORIDE SERPL-SCNC: 105 MMOL/L (ref 100–111)
CHOLEST SERPL-MCNC: 165 MG/DL
CO2 SERPL-SCNC: 28 MMOL/L (ref 21–32)
CREAT SERPL-MCNC: 0.75 MG/DL (ref 0.6–1.3)
CREAT UR-MCNC: 99 MG/DL (ref 30–125)
EST. AVERAGE GLUCOSE BLD GHB EST-MCNC: 137 MG/DL
GLOBULIN SER CALC-MCNC: 3.2 G/DL (ref 2–4)
GLUCOSE SERPL-MCNC: 127 MG/DL (ref 74–99)
HBA1C MFR BLD: 6.4 % (ref 4.2–5.6)
HDLC SERPL-MCNC: 50 MG/DL (ref 40–60)
HDLC SERPL: 3.3 (ref 0–5)
LDLC SERPL CALC-MCNC: 87.8 MG/DL (ref 0–100)
LIPID PROFILE,FLP: NORMAL
MICROALBUMIN UR-MCNC: 0.57 MG/DL (ref 0–3)
MICROALBUMIN/CREAT UR-RTO: 6 MG/G (ref 0–30)
POTASSIUM SERPL-SCNC: 3.9 MMOL/L (ref 3.5–5.5)
PROT SERPL-MCNC: 7.9 G/DL (ref 6.4–8.2)
SODIUM SERPL-SCNC: 139 MMOL/L (ref 136–145)
TRIGL SERPL-MCNC: 136 MG/DL (ref ?–150)
VLDLC SERPL CALC-MCNC: 27.2 MG/DL

## 2023-02-01 PROCEDURE — 80061 LIPID PANEL: CPT

## 2023-02-01 PROCEDURE — 82043 UR ALBUMIN QUANTITATIVE: CPT

## 2023-02-01 PROCEDURE — 36415 COLL VENOUS BLD VENIPUNCTURE: CPT

## 2023-02-01 PROCEDURE — 83036 HEMOGLOBIN GLYCOSYLATED A1C: CPT

## 2023-02-01 PROCEDURE — 80053 COMPREHEN METABOLIC PANEL: CPT

## 2023-02-07 RX ORDER — ATORVASTATIN CALCIUM 20 MG/1
20 TABLET, FILM COATED ORAL DAILY
Qty: 90 TABLET | Refills: 1 | Status: CANCELLED | OUTPATIENT
Start: 2023-02-07

## 2023-02-08 ENCOUNTER — HOSPITAL ENCOUNTER (OUTPATIENT)
Dept: LAB | Age: 63
Discharge: HOME | End: 2023-02-08
Payer: COMMERCIAL

## 2023-02-08 ENCOUNTER — OFFICE VISIT (OUTPATIENT)
Dept: INTERNAL MEDICINE CLINIC | Age: 63
End: 2023-02-08
Payer: COMMERCIAL

## 2023-02-08 VITALS
DIASTOLIC BLOOD PRESSURE: 61 MMHG | WEIGHT: 237 LBS | HEIGHT: 68 IN | SYSTOLIC BLOOD PRESSURE: 123 MMHG | RESPIRATION RATE: 18 BRPM | HEART RATE: 68 BPM | OXYGEN SATURATION: 97 % | BODY MASS INDEX: 35.92 KG/M2 | TEMPERATURE: 98.1 F

## 2023-02-08 DIAGNOSIS — E11.65 UNCONTROLLED TYPE 2 DIABETES MELLITUS WITH HYPERGLYCEMIA (HCC): ICD-10-CM

## 2023-02-08 DIAGNOSIS — I10 PRIMARY HYPERTENSION: ICD-10-CM

## 2023-02-08 DIAGNOSIS — Z00.00 ANNUAL PHYSICAL EXAM: Primary | ICD-10-CM

## 2023-02-08 DIAGNOSIS — F32.A ANXIETY AND DEPRESSION: ICD-10-CM

## 2023-02-08 DIAGNOSIS — Z99.89 OSA ON CPAP: ICD-10-CM

## 2023-02-08 DIAGNOSIS — E78.2 MIXED HYPERLIPIDEMIA: ICD-10-CM

## 2023-02-08 DIAGNOSIS — F41.9 ANXIETY AND DEPRESSION: ICD-10-CM

## 2023-02-08 DIAGNOSIS — G47.33 OSA ON CPAP: ICD-10-CM

## 2023-02-08 DIAGNOSIS — R43.8 COVID-19 LONG HAULER MANIFESTING CHRONIC LOSS OF SMELL AND TASTE: ICD-10-CM

## 2023-02-08 DIAGNOSIS — Z23 ENCOUNTER FOR IMMUNIZATION: ICD-10-CM

## 2023-02-08 DIAGNOSIS — U09.9 COVID-19 LONG HAULER MANIFESTING CHRONIC LOSS OF SMELL AND TASTE: ICD-10-CM

## 2023-02-08 DIAGNOSIS — E66.01 CLASS 2 SEVERE OBESITY DUE TO EXCESS CALORIES WITH SERIOUS COMORBIDITY AND BODY MASS INDEX (BMI) OF 36.0 TO 36.9 IN ADULT (HCC): ICD-10-CM

## 2023-02-08 DIAGNOSIS — K21.9 GASTROESOPHAGEAL REFLUX DISEASE WITHOUT ESOPHAGITIS: ICD-10-CM

## 2023-02-08 PROBLEM — E66.9 OBESITY (BMI 30-39.9): Status: RESOLVED | Noted: 2019-05-10 | Resolved: 2023-02-08

## 2023-02-08 PROBLEM — E87.6 HYPOKALEMIA: Status: RESOLVED | Noted: 2021-11-21 | Resolved: 2023-02-08

## 2023-02-08 PROBLEM — E66.812 CLASS 2 SEVERE OBESITY DUE TO EXCESS CALORIES WITH SERIOUS COMORBIDITY AND BODY MASS INDEX (BMI) OF 36.0 TO 36.9 IN ADULT: Status: ACTIVE | Noted: 2023-02-08

## 2023-02-08 PROCEDURE — 93005 ELECTROCARDIOGRAM TRACING: CPT

## 2023-02-08 RX ORDER — OMEPRAZOLE 40 MG/1
40 CAPSULE, DELAYED RELEASE ORAL
Qty: 90 CAPSULE | Refills: 1 | Status: SHIPPED | OUTPATIENT
Start: 2023-02-08

## 2023-02-08 RX ORDER — LOSARTAN POTASSIUM 25 MG/1
25 TABLET ORAL DAILY
Qty: 90 TABLET | Refills: 1 | Status: SHIPPED | OUTPATIENT
Start: 2023-02-08

## 2023-02-08 RX ORDER — METFORMIN HYDROCHLORIDE 1000 MG/1
1000 TABLET ORAL
Qty: 90 TABLET | Refills: 1 | Status: SHIPPED | OUTPATIENT
Start: 2023-02-08

## 2023-02-08 RX ORDER — SPIRONOLACTONE 25 MG/1
25 TABLET ORAL DAILY
Qty: 90 TABLET | Refills: 1 | Status: SHIPPED | OUTPATIENT
Start: 2023-02-08

## 2023-02-08 RX ORDER — ATORVASTATIN CALCIUM 40 MG/1
40 TABLET, FILM COATED ORAL DAILY
Qty: 90 TABLET | Refills: 1 | Status: SHIPPED | OUTPATIENT
Start: 2023-02-08

## 2023-02-08 RX ORDER — AMLODIPINE BESYLATE 10 MG/1
10 TABLET ORAL DAILY
Qty: 90 TABLET | Refills: 1 | Status: SHIPPED | OUTPATIENT
Start: 2023-02-08

## 2023-02-08 NOTE — ASSESSMENT & PLAN NOTE
Eye exam: overdue. Enc to schedule appt  Immunizations: TDap today  Discussed need for Shingrix, Covid booster. Will discuss PNA 13 at follow up appt d/t DM, Chol, HTN.   Colonoscopy due 5/2024

## 2023-02-08 NOTE — PATIENT INSTRUCTIONS
Vaccine Information Statement    Tdap (Tetanus, Diphtheria, Pertussis) Vaccine: What You Need to Know     Many vaccine information statements are available in [place] and other languages. See www.immunize.org/vis. Hojas de información sobre vacunas están disponibles en español y en muchos otros idiomas. Visite www.immunize.org/vis. 1. Why get vaccinated? Tdap vaccine can prevent tetanus, diphtheria, and pertussis. Diphtheria and pertussis spread from person to person. Tetanus enters the body through cuts or wounds. TETANUS (T) causes painful stiffening of the muscles. Tetanus can lead to serious health problems, including being unable to open the mouth, having trouble swallowing and breathing, or death. DIPHTHERIA (D) can lead to difficulty breathing, heart failure, paralysis, or death. PERTUSSIS (aP), also known as whooping cough, can cause uncontrollable, violent coughing that makes it hard to breathe, eat, or drink. Pertussis can be extremely serious especially in babies and young children, causing pneumonia, convulsions, brain damage, or death. In teens and adults, it can cause weight loss, loss of bladder control, passing out, and rib fractures from severe coughing. 2. Tdap vaccine     Tdap is only for children 7 years and older, adolescents, and adults. Adolescents should receive a single dose of Tdap, preferably at age 6 or 15 years. Pregnant people should get a dose of Tdap during every pregnancy, preferably during the early part of the third trimester, to help protect the  from pertussis. Infants are most at risk for severe, life-threatening complications from pertussis. Adults who have never received Tdap should get a dose of Tdap.       Also, adults should receive a booster dose of either Tdap or Td (a different vaccine that protects against tetanus and diphtheria but not pertussis) every 10 years, or after 5 years in the case of a severe or dirty wound or burn.     Tdap may be given at the same time as other vaccines. 3. Talk with your health care provider    Tell your vaccination provider if the person getting the vaccine:  Has had an allergic reaction after a previous dose of any vaccine that protects against tetanus, diphtheria, or pertussis, or has any severe, life-threatening allergies   Has had a coma, decreased level of consciousness, or prolonged seizures within 7 days after a previous dose of any pertussis vaccine (DTP, DTaP, or Tdap)  Has seizures or another nervous system problem  Has ever had Guillain-Barré Syndrome (also called GBS)  Has had severe pain or swelling after a previous dose of any vaccine that protects against tetanus or diphtheria    In some cases, your health care provider may decide to postpone Tdap vaccination until a future visit. People with minor illnesses, such as a cold, may be vaccinated. People who are moderately or severely ill should usually wait until they recover before getting Tdap vaccine. Your health care provider can give you more information. 4. Risks of a vaccine reaction    Pain, redness, or swelling where the shot was given, mild fever, headache, feeling tired, and nausea, vomiting, diarrhea, or stomachache sometimes happen after Tdap vaccination. People sometimes faint after medical procedures, including vaccination. Tell your provider if you feel dizzy or have vision changes or ringing in the ears. As with any medicine, there is a very remote chance of a vaccine causing a severe allergic reaction, other serious injury, or death. 5. What if there is a serious problem? An allergic reaction could occur after the vaccinated person leaves the clinic.  If you see signs of a severe allergic reaction (hives, swelling of the face and throat, difficulty breathing, a fast heartbeat, dizziness, or weakness), call 9-1-1 and get the person to the nearest hospital.    For other signs that concern you, call your health care provider. Adverse reactions should be reported to the Vaccine Adverse Event Reporting System (VAERS). Your health care provider will usually file this report, or you can do it yourself. Visit the VAERS website at www.vaers. [facility].gov or call 4-966.454.3090. VAERS is only for reporting reactions, and VAERS staff members do not give medical advice. 6. The National Vaccine Injury Compensation Program    The [facility] Vaccine Injury Compensation Program (VICP) is a federal program that was created to compensate people who may have been injured by certain vaccines. Claims regarding alleged injury or death due to vaccination have a time limit for filing, which may be as short as two years. Visit the VICP website at www.[facility].gov/vaccinecompensation or call 5-849.656.5782 to learn about the program and about filing a claim. 7. How can I learn more? Ask your health care provider. Call your local or state health department. Visit the website of the Food and Drug Administration (FDA) for vaccine package inserts and additional information at www.fda.gov/vaccines-blood-biologics/vaccines. Contact the Centers for Disease Control and Prevention (CDC): Call 3-263.139.7176 (1-800-CDC-INFO) or  Visit CDCs website at www.cdc.gov/vaccines. Vaccine Information Statement   Tdap (Tetanus, Diphtheria, Pertussis) Vaccine  8/6/2021  [address]QU[address]ron [id number]   Department of Health and Human Services  Centers for Disease Control and Prevention    Office Use Only

## 2023-02-08 NOTE — ASSESSMENT & PLAN NOTE
BMI is out of normal parameters and plan is as follows: Discussed in great detail on diet, portion control, exercise, avoiding foods high in sugar, carbs and starches, fatty/greasy foods and to eat until satisfied not til full. I have counseled this patient on diet and exercise regimens as well. He is a stress eater. Discussed strategies.  Recommended counseling, Wt loss program.

## 2023-02-08 NOTE — ASSESSMENT & PLAN NOTE
well controlled, continue current medications   Avoid gut irritants such as spicy foods  Avoid laying down for 30 to 45 minutes after eating  Avoid excessive alcohol consumption

## 2023-02-08 NOTE — PROGRESS NOTES
Lokesh Huggins presents today for No chief complaint on file. Is someone accompanying this pt? no    Is the patient using any DME equipment during [address]? no    Depression Screening:  3 most recent PHQ Screens 8/10/2022   Little interest or pleasure in doing things Not at all   Feeling down, depressed, irritable, or hopeless Not at all   Total Score PHQ 2 0   Trouble falling or staying asleep, or sleeping too much Not at all   Feeling tired or having little energy Not at all   Poor appetite, weight loss, or overeating Not at all   Feeling bad about yourself - or that you are a failure or have let yourself or your family down Not at all   Trouble concentrating on things such as school, work, reading, or watching TV Not at all   Moving or speaking so slowly that other people could have noticed; or the opposite being so fidgety that others notice Not at all   Thoughts of being better off dead, or hurting yourself in some way Not at all   PHQ 9 Score 0   How difficult have these problems made it for you to do your work, take care of your home and get along with others Not difficult at all       Learning Assessment:  No flowsheet data found. Abuse Screening:  No flowsheet data found. Fall Risk  No flowsheet data found. ADL  No flowsheet data found. Health Maintenance reviewed and discussed and ordered per Provider. Health Maintenance Due   Topic Date Due    Foot Exam Q1  Never done    Eye Exam Retinal or Dilated  Never done    DTaP/Tdap/Td series (1 - Tdap) Never done    Shingles Vaccine (1 of 2) Never done    COVID-19 Vaccine (4 - Booster for Moderna series) 01/28/2022    Flu Vaccine (1) 08/01/2022   . Coordination of Care:  1. Have you been to the ER, urgent care clinic since your last visit? Hospitalized since your last visit? no    2. Have you seen or consulted any other health care providers outside of the [address] [place] [address] since your last visit?  Include any pap smears or colon screening. no    3. For patients aged 39-70: Has the patient had a colonoscopy? Yes - no Care Gap present     If the patient is female:    4. For patients aged 41-77: Has the patient had a mammogram within the past 2 years? NA - based on age    11. For patients aged 21-65: Has the patient had a pap smear? NA - based on age  Jocelin Christian is a 66 y.o. male who presents for routine immunizations. He denies any symptoms , reactions or allergies that would exclude them from being immunized today. Risks and adverse reactions were discussed and the VIS was given to them. All questions were addressed. He was observed for 5 min post injection. There were no reactions observed. Verbal order for TDAP IM received per Raimla Rowe DNP for pt Jocelin Christian. Order written down and read back to provider for verification prior to completion.

## 2023-02-08 NOTE — ASSESSMENT & PLAN NOTE
Uncontrolled  ;LDL 87  Increase lipitor to 40mg every day   Reduce fried fatty or oily foods in diet  Limit red meats, processed foods, and alcohol.     Limit fast food  Work on weight reduction  Increase water intake

## 2023-02-08 NOTE — ASSESSMENT & PLAN NOTE
A1c 7.2-6.4  Cont current regimen  Foot assessment completed  Reduce carbs and sweets in diet  Reduce weight

## 2023-02-08 NOTE — ASSESSMENT & PLAN NOTE
well controlled, continue current medications   Limit sodium in diet to 2g/d  Initiate cardio exercise  Weight reduction  Increase water intake  Check BP and report if 3 consecutive readings greater than 139/89 to office  Ordered EKG.  Last EKG 2019-normal

## 2023-02-08 NOTE — ASSESSMENT & PLAN NOTE
[facility]  [facility]  Supplied pt with list of local psych office  Enc to call and schedule appt

## 2023-02-08 NOTE — PROGRESS NOTES
[facility]      2/8/2023    Hill Contreras 1960 is a pleasant WHITE/NON- male for CPE. Reason for Visit:    Chief Complaint   Patient presents with    Physical       Patient Active Problem List   Diagnosis Code    Erectile dysfunction due to arterial insufficiency N52.01    Primary hypertension I10    BHARAT on CPAP G47.33, Z99.89    Mixed hyperlipidemia E78.2    Neuropathy G62.9    Uncontrolled type 2 diabetes mellitus with hyperglycemia ([address] E11.65    Annual physical exam Z00.00    Gastroesophageal reflux disease without esophagitis K21.9    Class 2 severe obesity due to excess calories with serious comorbidity and body mass index (BMI) of 36.0 to 36.9 in adult ([address] E66.01, Z68.36    Anxiety and depression F41.9, F32. A    COVID-19 long hauler manifesting chronic loss of smell and taste R43.8, U09.9       Past Medical History:   Diagnosis Date    Erectile dysfunction due to arterial insufficiency 3/6/2019    GERD (gastroesophageal reflux disease)     Mixed hyperlipidemia 11/29/2021    BHARAT on CPAP     Primary hypertension 3/6/2019    Uncontrolled type 2 diabetes mellitus with hyperglycemia ([address] 8/15/2022       Past Surgical History:   Procedure Laterality Date    HX HEENT      Ear Tubes    HX LYMPHADENECTOMY      left axilla    HX MOHS PROCEDURES      nose    HX OTHER SURGICAL      Melanoma removed on back        Family History   Problem Relation Age of Onset    High Cholesterol Mother     Diabetes Mother     No Known Problems Father     No Known Problems Sister        Immunization History   Administered Date(s) Administered    COVID-19, MODERNA BLUE border, Primary or Immunocompromised, (age 18y+), IM, 100 mcg/0.5mL 03/31/2021, 04/28/2021, 12/05/2022    COVID-19, MODERNA Booster BLUE border, (age 18y+), IM, 50mcg/0.25mL 12/03/2021    Influenza Vaccine 10/01/2021    Tdap 02/08/2023    Zoster Recombinant 12/01/2022       Allergies   Allergen Reactions    Lisinopril Cough Current Outpatient Medications:     amLODIPine (NORVASC) 10 mg tablet, Take 1 Tablet by mouth daily. , Disp: 90 Tablet, Rfl: 1    losartan (COZAAR) 25 mg tablet, Take 1 Tablet by mouth daily. , Disp: 90 Tablet, Rfl: 1    metFORMIN (GLUCOPHAGE) 1,000 mg tablet, Take 1 Tablet by mouth nightly., Disp: 90 Tablet, Rfl: 1    omeprazole (PRILOSEC) 40 mg capsule, Take 1 Capsule by mouth daily as needed for PRN Reason (Other) (reflux). , Disp: 90 Capsule, Rfl: 1    spironolactone (ALDACTONE) 25 mg tablet, Take 1 Tablet by mouth daily. , Disp: 90 Tablet, Rfl: 1    atorvastatin (LIPITOR) 40 mg tablet, Take 1 Tablet by mouth daily. , Disp: 90 Tablet, Rfl: 1    ACETAMINOPHEN PO, Take  by mouth. (Patient not taking: Reported on 2/8/2023), Disp: , Rfl:     flash glucose sensor (FreeStyle Shaunna 2 Sensor) kit, Use as directed to check blood sugars 3 times daily. (Patient not taking: No sig reported), Disp: 2 Kit, Rfl: 5    sildenafil citrate (VIAGRA) 100 mg tablet, 1 tablet by mouth at least 1 hour before intercourse (Patient not taking: No sig reported), Disp: 30 Tab, Rfl: 5    Todays concern:  DM. Taking metformin. Weight. Lost some and gained some. Avoided fast foods for 4m. Now eating fast food periodically. Tries to limit. No sweets. Over portion for carbs. Eats when not hungry; stress eats; eats when bored. Anxiety/Depression. Used to see a counselor. , lives alone. No friends. Will research for new counselor. Pains in the chest years ago that subsided. Will have pain in his armpit at times but will go away. Loss of taste/smell. Developed in 2021 when he was ill with Covid. Denies CP, SOB, GI/ issues, dizziness, fatigue. Tolerating medications w/o side effects.       Visit Vitals  /61   Pulse 68   Temp 98.1 °F (36.7 °C) (Temporal)   Resp 18   Ht 5' 8\" (1.727 m)   Wt 237 lb (107.5 kg)   SpO2 97%   BMI 36.04 kg/m²       Physical Examination:  General appearance - overweight and well hydrated  Eyes - wnl  Ears - bilateral TM's and external ear canals normal  Nose - normal and patent, no erythema, discharge or polyps  Mouth - mucous membranes moist, pharynx normal without lesions  Chest - clear to auscultation, no wheezes, rales or rhonchi, symmetric air entry  Heart - normal rate, regular rhythm, normal S1, S2, no murmurs, rubs, clicks or gallops  Abdomen - soft, nontender, nondistended, no masses or organomegaly  Musculoskeletal - no joint tenderness, deformity or swelling  Extremities - peripheral pulses normal, no pedal edema, no clubbing or cyanosis    Lab Review:  A1c 7.2-6.4  ;LDL 87    Assessment:  1. Annual physical exam  Assessment & Plan:  Eye exam: overdue. Enc to schedule appt  Immunizations: TDap today  Discussed need for Shingrix, Covid booster. Will discuss PNA 13 at follow up appt d/t DM, Chol, HTN. Colonoscopy due 5/2024  2. Primary hypertension  Assessment & Plan:   well controlled, continue current medications   Limit sodium in diet to 2g/d  Initiate cardio exercise  Weight reduction  Increase water intake  Check BP and report if 3 consecutive readings greater than 139/89 to office  Ordered EKG. Last EKG 2019-normal  Orders:  -     amLODIPine (NORVASC) 10 mg tablet; Take 1 Tablet by mouth daily. , Normal, Disp-90 Tablet, R-1  -     losartan (COZAAR) 25 mg tablet; Take 1 Tablet by mouth daily. , Normal, Disp-90 Tablet, R-1  -     spironolactone (ALDACTONE) 25 mg tablet; Take 1 Tablet by mouth daily. , Normal, Disp-90 Tablet, R-1  -     EKG, 12 LEAD, INITIAL; Future  3. Mixed hyperlipidemia  Assessment & Plan:  Uncontrolled  ;LDL 87  Increase lipitor to 40mg every day   Reduce fried fatty or oily foods in diet  Limit red meats, processed foods, and alcohol. Limit fast food  Work on weight reduction  Increase water intake  Orders:  -     EKG, 12 LEAD, INITIAL; Future  -     atorvastatin (LIPITOR) 40 mg tablet; Take 1 Tablet by mouth daily. , Normal, Disp-90 Tablet, R-1  4. Uncontrolled type 2 diabetes mellitus with hyperglycemia (HCC)  Assessment & Plan:  A1c 7.2-6.4  Cont current regimen  Foot assessment completed  Reduce carbs and sweets in diet  Reduce weight  Orders:  -     metFORMIN (GLUCOPHAGE) 1,000 mg tablet; Take 1 Tablet by mouth nightly., Normal, Disp-90 Tablet, R-1  -     EKG, 12 LEAD, INITIAL; Future  5. Gastroesophageal reflux disease without esophagitis  Assessment & Plan:   well controlled, continue current medications   Avoid gut irritants such as spicy foods  Avoid laying down for 30 to 45 minutes after eating  Avoid excessive alcohol consumption  Orders:  -     omeprazole (PRILOSEC) 40 mg capsule; Take 1 Capsule by mouth daily as needed for PRN Reason (Other) (reflux). , Normal, Disp-90 Capsule, R-1  6. Anxiety and depression  Assessment & Plan:  Offered referral to psychiatry-TING  Pt declined  Supplied pt with list of local psych office  Enc to call and schedule appt  7. COVID-19 long hauler manifesting chronic loss of smell and taste  Assessment & Plan:  No tx at this time  8. Class 2 severe obesity due to excess calories with serious comorbidity and body mass index (BMI) of 36.0 to 36.9 in adult [facility])  Assessment & Plan:  BMI is out of normal parameters and plan is as follows: Discussed in great detail on diet, portion control, exercise, avoiding foods high in sugar, carbs and starches, fatty/greasy foods and to eat until satisfied not til full. I have counseled this patient on diet and exercise regimens as well. He is a stress eater. Discussed strategies. Recommended counseling, Wt loss program.  9. BHARAT on CPAP  Assessment & Plan:  Recommended SoClean to help keep equipment free of bacteria  10. Encounter for immunization  -     TDAP, BOOSTRIX, (AGE 10 YRS+), IM  -     [place] IM ADM PRQ ID SUBQ/IM NJXS 1 VACCINE      Plan:  1. Continue current meds as prescribed.       Follow-up and Dispositions    Return in about 6 months (around 8/8/2023) for Hypertension, Diabetes , POC A1c.          Annice Meter, DNP

## 2023-02-10 ENCOUNTER — TELEPHONE (OUTPATIENT)
Dept: INTERNAL MEDICINE CLINIC | Age: 63
End: 2023-02-10

## 2023-02-10 LAB
ATRIAL RATE: 73 BPM
CALCULATED P AXIS, ECG09: 38 DEGREES
CALCULATED R AXIS, ECG10: 43 DEGREES
CALCULATED T AXIS, ECG11: 71 DEGREES
DIAGNOSIS, 93000: NORMAL
P-R INTERVAL, ECG05: 160 MS
Q-T INTERVAL, ECG07: 388 MS
QRS DURATION, ECG06: 82 MS
QTC CALCULATION (BEZET), ECG08: 427 MS
VENTRICULAR RATE, ECG03: 73 BPM

## 2023-02-10 NOTE — TELEPHONE ENCOUNTER
----- Message from Marilyn Veliz DNP sent at 2/10/2023 10:24 AM EST -----  [facility] nurses, pls contact patient with the following:    EKG normal    Thank you

## 2023-03-10 PROBLEM — Z00.00 ANNUAL PHYSICAL EXAM: Status: RESOLVED | Noted: 2023-02-08 | Resolved: 2023-03-10

## 2023-07-13 RX ORDER — AMLODIPINE BESYLATE 10 MG/1
TABLET ORAL
Qty: 90 TABLET | Refills: 3 | OUTPATIENT
Start: 2023-07-13

## 2023-07-13 RX ORDER — LOSARTAN POTASSIUM 25 MG/1
TABLET ORAL
Qty: 90 TABLET | Refills: 3 | OUTPATIENT
Start: 2023-07-13

## 2023-07-13 RX ORDER — SPIRONOLACTONE 25 MG/1
TABLET ORAL
Qty: 90 TABLET | Refills: 3 | OUTPATIENT
Start: 2023-07-13

## 2023-07-13 RX ORDER — ATORVASTATIN CALCIUM 40 MG/1
TABLET, FILM COATED ORAL
Qty: 90 TABLET | Refills: 3 | OUTPATIENT
Start: 2023-07-13

## 2023-07-13 NOTE — TELEPHONE ENCOUNTER
Please disregard patient given 6 months of medications on 2/8/23.  Next appointment 8/9/23    Last OV:   Last labs:  Next OV and labs:

## 2023-08-09 ENCOUNTER — OFFICE VISIT (OUTPATIENT)
Age: 63
End: 2023-08-09
Payer: COMMERCIAL

## 2023-08-09 VITALS
HEIGHT: 68 IN | DIASTOLIC BLOOD PRESSURE: 63 MMHG | SYSTOLIC BLOOD PRESSURE: 135 MMHG | RESPIRATION RATE: 18 BRPM | BODY MASS INDEX: 36.6 KG/M2 | TEMPERATURE: 98.1 F | OXYGEN SATURATION: 97 % | WEIGHT: 241.5 LBS | HEART RATE: 73 BPM

## 2023-08-09 DIAGNOSIS — K21.9 GASTROESOPHAGEAL REFLUX DISEASE WITHOUT ESOPHAGITIS: ICD-10-CM

## 2023-08-09 DIAGNOSIS — F41.9 ANXIETY AND DEPRESSION: ICD-10-CM

## 2023-08-09 DIAGNOSIS — I10 PRIMARY HYPERTENSION: ICD-10-CM

## 2023-08-09 DIAGNOSIS — E11.65 UNCONTROLLED TYPE 2 DIABETES MELLITUS WITH HYPERGLYCEMIA (HCC): Primary | ICD-10-CM

## 2023-08-09 DIAGNOSIS — E78.2 MIXED HYPERLIPIDEMIA: ICD-10-CM

## 2023-08-09 DIAGNOSIS — F32.A ANXIETY AND DEPRESSION: ICD-10-CM

## 2023-08-09 DIAGNOSIS — E66.01 CLASS 2 SEVERE OBESITY DUE TO EXCESS CALORIES WITH SERIOUS COMORBIDITY AND BODY MASS INDEX (BMI) OF 36.0 TO 36.9 IN ADULT (HCC): ICD-10-CM

## 2023-08-09 LAB — HBA1C MFR BLD: 8.7 %

## 2023-08-09 PROCEDURE — 3078F DIAST BP <80 MM HG: CPT | Performed by: NURSE PRACTITIONER

## 2023-08-09 PROCEDURE — 3075F SYST BP GE 130 - 139MM HG: CPT | Performed by: NURSE PRACTITIONER

## 2023-08-09 PROCEDURE — 83036 HEMOGLOBIN GLYCOSYLATED A1C: CPT | Performed by: NURSE PRACTITIONER

## 2023-08-09 PROCEDURE — 3044F HG A1C LEVEL LT 7.0%: CPT | Performed by: NURSE PRACTITIONER

## 2023-08-09 PROCEDURE — 99214 OFFICE O/P EST MOD 30 MIN: CPT | Performed by: NURSE PRACTITIONER

## 2023-08-09 RX ORDER — OMEPRAZOLE 40 MG/1
40 CAPSULE, DELAYED RELEASE ORAL DAILY PRN
Qty: 90 CAPSULE | Refills: 0 | Status: SHIPPED | OUTPATIENT
Start: 2023-08-09

## 2023-08-09 RX ORDER — ATORVASTATIN CALCIUM 20 MG/1
20 TABLET, FILM COATED ORAL DAILY
Qty: 90 TABLET | Refills: 1 | Status: SHIPPED | OUTPATIENT
Start: 2023-08-09

## 2023-08-09 RX ORDER — AMLODIPINE BESYLATE 10 MG/1
10 TABLET ORAL DAILY
Qty: 90 TABLET | Refills: 1 | Status: SHIPPED | OUTPATIENT
Start: 2023-08-09

## 2023-08-09 RX ORDER — SPIRONOLACTONE 25 MG/1
25 TABLET ORAL DAILY
Qty: 90 TABLET | Refills: 0 | Status: SHIPPED | OUTPATIENT
Start: 2023-08-09

## 2023-08-09 RX ORDER — LOSARTAN POTASSIUM 25 MG/1
25 TABLET ORAL DAILY
Qty: 90 TABLET | Refills: 1 | Status: SHIPPED | OUTPATIENT
Start: 2023-08-09

## 2023-08-09 SDOH — ECONOMIC STABILITY: FOOD INSECURITY: WITHIN THE PAST 12 MONTHS, YOU WORRIED THAT YOUR FOOD WOULD RUN OUT BEFORE YOU GOT MONEY TO BUY MORE.: NEVER TRUE

## 2023-08-09 SDOH — ECONOMIC STABILITY: HOUSING INSECURITY
IN THE LAST 12 MONTHS, WAS THERE A TIME WHEN YOU DID NOT HAVE A STEADY PLACE TO SLEEP OR SLEPT IN A SHELTER (INCLUDING NOW)?: NO

## 2023-08-09 SDOH — ECONOMIC STABILITY: FOOD INSECURITY: WITHIN THE PAST 12 MONTHS, THE FOOD YOU BOUGHT JUST DIDN'T LAST AND YOU DIDN'T HAVE MONEY TO GET MORE.: NEVER TRUE

## 2023-08-09 SDOH — ECONOMIC STABILITY: INCOME INSECURITY: HOW HARD IS IT FOR YOU TO PAY FOR THE VERY BASICS LIKE FOOD, HOUSING, MEDICAL CARE, AND HEATING?: NOT HARD AT ALL

## 2023-08-09 ASSESSMENT — PATIENT HEALTH QUESTIONNAIRE - PHQ9
SUM OF ALL RESPONSES TO PHQ QUESTIONS 1-9: 0
1. LITTLE INTEREST OR PLEASURE IN DOING THINGS: 0
SUM OF ALL RESPONSES TO PHQ QUESTIONS 1-9: 0
2. FEELING DOWN, DEPRESSED OR HOPELESS: 0
SUM OF ALL RESPONSES TO PHQ9 QUESTIONS 1 & 2: 0

## 2023-08-09 NOTE — ASSESSMENT & PLAN NOTE
Uncontrolled, continue current medications and lifestyle modifications recommended   A1c 6.5-8.7 (POC)  Discussed alternate therapies ie insulin, SGLT2 inhibitors, GLP1. Pt wishes to increase metformin to 1000mg BID and work on diet and exercise.   Discussed neg impact on health w/ uncontrolled DM  José Miguel levels 3m

## 2023-08-09 NOTE — ASSESSMENT & PLAN NOTE
+4lbs in 6m  Discussed diet and exercise regimens  Pt emotionally eats  Working with [employer]

## 2023-08-09 NOTE — ASSESSMENT & PLAN NOTE
Monitored by specialist- no acute findings meriting change in the plan   Cont to work with [facility] [place]

## 2023-09-15 ENCOUNTER — TELEPHONE (OUTPATIENT)
Age: 63
End: 2023-09-15

## 2023-09-15 DIAGNOSIS — E78.2 MIXED HYPERLIPIDEMIA: Primary | ICD-10-CM

## 2023-09-15 NOTE — TELEPHONE ENCOUNTER
This RN received a call from patient to inform us that he received Atorvastatin 20 mgs. via mail order instead of usual dose of 40 mg. tabs. Patient wants to know if he is supposed to be taking the 20 mg. tablets now. Patient is going out of town on vacation until Friday. This RN reviewed the last 2 office visit notes and it appears that Dr. Zana Leventhal wrote in 8/9/23 note  \"Well-controlled, continue current medications. \"  However, under orders she put \"Atorvastatin 20 mgs. daily\" This RN advised patient to continue same dosage of 40 mgs. until we can get clarification from Dr. Zana Leventhal. This RN informed patient that she would send a message to Dr. Zana Leventhal for clarification but he may not get a response from us until Monday.

## 2023-09-18 RX ORDER — ATORVASTATIN CALCIUM 40 MG/1
40 TABLET, FILM COATED ORAL DAILY
Qty: 100 TABLET | Refills: 0 | Status: SHIPPED | OUTPATIENT
Start: 2023-09-18

## 2023-09-18 NOTE — TELEPHONE ENCOUNTER
Take atorvastatin 20mg 2 tabs til all gone then start atorvatstin 40mg qd.      Requested Prescriptions     Signed Prescriptions Disp Refills    atorvastatin (LIPITOR) 40 MG tablet 100 tablet 0     Sig: Take 1 tablet by mouth daily     Authorizing Provider: Shola Springer

## 2023-10-10 DIAGNOSIS — E11.65 UNCONTROLLED TYPE 2 DIABETES MELLITUS WITH HYPERGLYCEMIA (HCC): ICD-10-CM

## 2023-10-10 DIAGNOSIS — I10 PRIMARY HYPERTENSION: ICD-10-CM

## 2023-10-10 RX ORDER — SPIRONOLACTONE 25 MG/1
25 TABLET ORAL DAILY
Qty: 90 TABLET | Refills: 3 | OUTPATIENT
Start: 2023-10-10

## 2023-11-01 NOTE — TELEPHONE ENCOUNTER
Message sent via My Chart to inform him that Dr Maryjane Castorena recommended that he take 2 tablets of the 20 mg. Atorvastatin tablets until they are gone and then start Atorvastatin 40 mgs. Attempted to call patient again to inform him of the directions for Atorvastatin but got a voice mail.

## 2023-11-01 NOTE — TELEPHONE ENCOUNTER
Left voice mail for patient to inform him that the high cholesterol medication dosage for him was clarified and I wanted to make sure that he was aware of it. I informed patient that I would be sending him a message via My Chart regarding the clarification.

## 2023-11-14 ENCOUNTER — TELEPHONE (OUTPATIENT)
Age: 63
End: 2023-11-14

## 2023-11-14 DIAGNOSIS — E11.65 UNCONTROLLED TYPE 2 DIABETES MELLITUS WITH HYPERGLYCEMIA (HCC): ICD-10-CM

## 2023-11-14 NOTE — TELEPHONE ENCOUNTER
Patient called, he is going to run out of his prescription for Metformin this week, and doesn't see Dr Glass Part until 11/27/2023. He is asking if he can get a 30 day supply of his medications for metformin and atorvastatin sent to a local pharmacy and then get his regular refills from the mail order. Pharmacy: [facility]   Please advise, thank you.

## 2023-11-14 NOTE — TELEPHONE ENCOUNTER
Atorvastatin sent in 100 tabs on 9/18/23. He should have enough meds to hold him til early Jan 2024. I sent in 30 day supply of metformin.      Requested Prescriptions     Signed Prescriptions Disp Refills    metFORMIN (GLUCOPHAGE) 1000 MG tablet 60 tablet 0     Sig: Take 1 tablet by mouth 2 times daily (with meals)     Authorizing Provider: Jose Hearn

## 2023-11-15 ENCOUNTER — HOSPITAL ENCOUNTER (OUTPATIENT)
Facility: HOSPITAL | Age: 63
Setting detail: SPECIMEN
Discharge: HOME | End: 2023-11-18
Payer: COMMERCIAL

## 2023-11-15 DIAGNOSIS — E11.65 UNCONTROLLED TYPE 2 DIABETES MELLITUS WITH HYPERGLYCEMIA (HCC): ICD-10-CM

## 2023-11-15 DIAGNOSIS — I10 PRIMARY HYPERTENSION: ICD-10-CM

## 2023-11-15 LAB
ANION GAP SERPL CALC-SCNC: 6 MMOL/L (ref 3–18)
BUN SERPL-MCNC: 12 MG/DL (ref 7–18)
BUN/CREAT SERPL: 17 (ref 12–20)
CALCIUM SERPL-MCNC: 9.6 MG/DL (ref 8.5–10.1)
CHLORIDE SERPL-SCNC: 106 MMOL/L (ref 100–111)
CO2 SERPL-SCNC: 29 MMOL/L (ref 21–32)
CREAT SERPL-MCNC: 0.71 MG/DL (ref 0.6–1.3)
EST. AVERAGE GLUCOSE BLD GHB EST-MCNC: 111 MG/DL
GLUCOSE SERPL-MCNC: 88 MG/DL (ref 74–99)
HBA1C MFR BLD: 5.5 % (ref 4.2–5.6)
POTASSIUM SERPL-SCNC: 3.9 MMOL/L (ref 3.5–5.5)
SODIUM SERPL-SCNC: 141 MMOL/L (ref 136–145)

## 2023-11-15 PROCEDURE — 80048 BASIC METABOLIC PNL TOTAL CA: CPT

## 2023-11-15 PROCEDURE — 83036 HEMOGLOBIN GLYCOSYLATED A1C: CPT

## 2023-11-15 PROCEDURE — 36415 COLL VENOUS BLD VENIPUNCTURE: CPT

## 2023-11-19 DIAGNOSIS — E78.2 MIXED HYPERLIPIDEMIA: ICD-10-CM

## 2023-11-22 RX ORDER — ATORVASTATIN CALCIUM 40 MG/1
40 TABLET, FILM COATED ORAL DAILY
Qty: 90 TABLET | Refills: 3 | OUTPATIENT
Start: 2023-11-22

## 2023-11-27 ENCOUNTER — OFFICE VISIT (OUTPATIENT)
Age: 63
End: 2023-11-27
Payer: COMMERCIAL

## 2023-11-27 VITALS
WEIGHT: 227 LBS | DIASTOLIC BLOOD PRESSURE: 74 MMHG | TEMPERATURE: 98.6 F | HEART RATE: 72 BPM | RESPIRATION RATE: 16 BRPM | OXYGEN SATURATION: 100 % | SYSTOLIC BLOOD PRESSURE: 133 MMHG | HEIGHT: 68 IN | BODY MASS INDEX: 34.4 KG/M2

## 2023-11-27 DIAGNOSIS — E66.01 CLASS 2 SEVERE OBESITY DUE TO EXCESS CALORIES WITH SERIOUS COMORBIDITY AND BODY MASS INDEX (BMI) OF 36.0 TO 36.9 IN ADULT (HCC): ICD-10-CM

## 2023-11-27 DIAGNOSIS — E78.2 MIXED HYPERLIPIDEMIA: ICD-10-CM

## 2023-11-27 DIAGNOSIS — M25.532 CHRONIC PAIN OF LEFT WRIST: ICD-10-CM

## 2023-11-27 DIAGNOSIS — G89.29 CHRONIC PAIN OF LEFT WRIST: ICD-10-CM

## 2023-11-27 DIAGNOSIS — K21.9 GASTROESOPHAGEAL REFLUX DISEASE WITHOUT ESOPHAGITIS: ICD-10-CM

## 2023-11-27 DIAGNOSIS — I10 PRIMARY HYPERTENSION: ICD-10-CM

## 2023-11-27 DIAGNOSIS — E11.65 UNCONTROLLED TYPE 2 DIABETES MELLITUS WITH HYPERGLYCEMIA (HCC): Primary | ICD-10-CM

## 2023-11-27 PROBLEM — E66.811 CLASS 1 OBESITY DUE TO EXCESS CALORIES WITH SERIOUS COMORBIDITY AND BODY MASS INDEX (BMI) OF 34.0 TO 34.9 IN ADULT: Status: ACTIVE | Noted: 2023-11-27

## 2023-11-27 PROBLEM — E66.811 CLASS 1 OBESITY DUE TO EXCESS CALORIES WITH SERIOUS COMORBIDITY AND BODY MASS INDEX (BMI) OF 34.0 TO 34.9 IN ADULT: Status: RESOLVED | Noted: 2023-11-27 | Resolved: 2023-11-27

## 2023-11-27 PROBLEM — E66.812 CLASS 2 SEVERE OBESITY DUE TO EXCESS CALORIES WITH SERIOUS COMORBIDITY AND BODY MASS INDEX (BMI) OF 36.0 TO 36.9 IN ADULT: Status: ACTIVE | Noted: 2023-02-08

## 2023-11-27 PROBLEM — E66.09 CLASS 1 OBESITY DUE TO EXCESS CALORIES WITH SERIOUS COMORBIDITY AND BODY MASS INDEX (BMI) OF 34.0 TO 34.9 IN ADULT: Status: ACTIVE | Noted: 2023-11-27

## 2023-11-27 PROBLEM — E66.09 CLASS 1 OBESITY DUE TO EXCESS CALORIES WITH SERIOUS COMORBIDITY AND BODY MASS INDEX (BMI) OF 34.0 TO 34.9 IN ADULT: Status: RESOLVED | Noted: 2023-11-27 | Resolved: 2023-11-27

## 2023-11-27 PROCEDURE — 3044F HG A1C LEVEL LT 7.0%: CPT | Performed by: NURSE PRACTITIONER

## 2023-11-27 PROCEDURE — 99213 OFFICE O/P EST LOW 20 MIN: CPT | Performed by: NURSE PRACTITIONER

## 2023-11-27 PROCEDURE — 3075F SYST BP GE 130 - 139MM HG: CPT | Performed by: NURSE PRACTITIONER

## 2023-11-27 PROCEDURE — 3078F DIAST BP <80 MM HG: CPT | Performed by: NURSE PRACTITIONER

## 2023-11-27 RX ORDER — AMLODIPINE BESYLATE 10 MG/1
10 TABLET ORAL DAILY
Qty: 93 TABLET | Refills: 1 | Status: SHIPPED | OUTPATIENT
Start: 2023-11-27

## 2023-11-27 RX ORDER — ATORVASTATIN CALCIUM 40 MG/1
40 TABLET, FILM COATED ORAL DAILY
Qty: 93 TABLET | Refills: 1 | Status: SHIPPED | OUTPATIENT
Start: 2023-11-27

## 2023-11-27 RX ORDER — LOSARTAN POTASSIUM 25 MG/1
25 TABLET ORAL DAILY
Qty: 93 TABLET | Refills: 1 | Status: SHIPPED | OUTPATIENT
Start: 2023-11-27

## 2023-11-27 RX ORDER — SPIRONOLACTONE 25 MG/1
25 TABLET ORAL DAILY
Qty: 93 TABLET | Refills: 1 | Status: SHIPPED | OUTPATIENT
Start: 2023-11-27

## 2023-11-27 RX ORDER — OMEPRAZOLE 40 MG/1
40 CAPSULE, DELAYED RELEASE ORAL DAILY PRN
Qty: 90 CAPSULE | Refills: 1 | Status: SHIPPED | OUTPATIENT
Start: 2023-11-27

## 2023-11-27 NOTE — ASSESSMENT & PLAN NOTE
Down 14 pounds in 3 months  Kudos given  Continue to work on [employer] exercise regimen

## 2023-11-27 NOTE — ASSESSMENT & PLAN NOTE
Encourage use of cock-up splint  Wear the splint only during the day while active, remove at night while sleeping  If symptoms not relieved while using the splint, will refer to hand specialist    DDx:  Arthritis  Carpal tunnel

## 2023-11-27 NOTE — ASSESSMENT & PLAN NOTE
Well-controlled, continue current medications   A1c 8.7-5.5  Much kudos given for drop in A1c 3 point  Recheck level 6 months    Continue metformin 1000 mg twice daily  If level continues to decline, will reduce frequency of metformin to daily and monitor  Continue working on proper diet  Initiate exercise regimen  Continue reduced carbs and sweets  Increase water intake

## 2023-12-14 DIAGNOSIS — E11.65 UNCONTROLLED TYPE 2 DIABETES MELLITUS WITH HYPERGLYCEMIA (HCC): ICD-10-CM

## 2023-12-14 NOTE — TELEPHONE ENCOUNTER
Sent to [facility] 11/27/23, called and left message for patient to see if he wanted it sent to the [facility] per the request from the pharmacy.

## 2024-04-26 DIAGNOSIS — I10 PRIMARY HYPERTENSION: ICD-10-CM

## 2024-04-26 RX ORDER — SPIRONOLACTONE 25 MG/1
25 TABLET ORAL DAILY
Qty: 90 TABLET | Refills: 0 | Status: SHIPPED | OUTPATIENT
Start: 2024-04-26

## 2024-04-27 DIAGNOSIS — E11.65 UNCONTROLLED TYPE 2 DIABETES MELLITUS WITH HYPERGLYCEMIA (HCC): ICD-10-CM

## 2024-05-20 ENCOUNTER — TELEPHONE (OUTPATIENT)
Age: 64
End: 2024-05-20

## 2024-05-20 DIAGNOSIS — Z00.00 LABORATORY TESTS ORDERED AS PART OF A COMPLETE PHYSICAL EXAM (CPE): Primary | ICD-10-CM

## 2024-05-20 NOTE — TELEPHONE ENCOUNTER
Diagnosis Orders   1. Laboratory tests ordered as part of a complete physical exam (CPE)  Comprehensive Metabolic Panel    Hemoglobin A1C    PSA Screening    Lipid Panel    Microalbumin / Creatinine Urine Ratio

## 2024-05-22 ENCOUNTER — HOSPITAL ENCOUNTER (OUTPATIENT)
Facility: HOSPITAL | Age: 64
Setting detail: SPECIMEN
Discharge: HOME | End: 2024-05-25
Payer: COMMERCIAL

## 2024-05-22 DIAGNOSIS — Z00.00 LABORATORY TESTS ORDERED AS PART OF A COMPLETE PHYSICAL EXAM (CPE): ICD-10-CM

## 2024-05-22 LAB
ALBUMIN SERPL-MCNC: 4.1 G/DL (ref 3.4–5)
ALBUMIN/GLOB SERPL: 1.5 (ref 0.8–1.7)
ALP SERPL-CCNC: 56 U/L (ref 45–117)
ALT SERPL-CCNC: 36 U/L (ref 16–61)
ANION GAP SERPL CALC-SCNC: 3 MMOL/L (ref 3–18)
AST SERPL-CCNC: 15 U/L (ref 10–38)
BILIRUB SERPL-MCNC: 0.7 MG/DL (ref 0.2–1)
BUN SERPL-MCNC: 11 MG/DL (ref 7–18)
BUN/CREAT SERPL: 14 (ref 12–20)
CALCIUM SERPL-MCNC: 9.7 MG/DL (ref 8.5–10.1)
CHLORIDE SERPL-SCNC: 107 MMOL/L (ref 100–111)
CHOLEST SERPL-MCNC: 120 MG/DL
CO2 SERPL-SCNC: 28 MMOL/L (ref 21–32)
CREAT SERPL-MCNC: 0.77 MG/DL (ref 0.6–1.3)
CREAT UR-MCNC: 53 MG/DL (ref 30–125)
EST. AVERAGE GLUCOSE BLD GHB EST-MCNC: 126 MG/DL
GLOBULIN SER CALC-MCNC: 2.8 G/DL (ref 2–4)
GLUCOSE SERPL-MCNC: 106 MG/DL (ref 74–99)
HBA1C MFR BLD: 6 % (ref 4.2–5.6)
HDLC SERPL-MCNC: 46 MG/DL (ref 40–60)
HDLC SERPL: 2.6 (ref 0–5)
LDLC SERPL CALC-MCNC: 50.2 MG/DL (ref 0–100)
LIPID PANEL: NORMAL
MICROALBUMIN UR-MCNC: <0.5 MG/DL (ref 0–3)
MICROALBUMIN/CREAT UR-RTO: NORMAL MG/G (ref 0–30)
POTASSIUM SERPL-SCNC: 4 MMOL/L (ref 3.5–5.5)
PROT SERPL-MCNC: 6.9 G/DL (ref 6.4–8.2)
PSA SERPL-MCNC: 0.9 NG/ML (ref 0–4)
SODIUM SERPL-SCNC: 138 MMOL/L (ref 136–145)
TRIGL SERPL-MCNC: 119 MG/DL
VLDLC SERPL CALC-MCNC: 23.8 MG/DL

## 2024-05-22 PROCEDURE — 82043 UR ALBUMIN QUANTITATIVE: CPT

## 2024-05-22 PROCEDURE — 36415 COLL VENOUS BLD VENIPUNCTURE: CPT

## 2024-05-22 PROCEDURE — 83036 HEMOGLOBIN GLYCOSYLATED A1C: CPT

## 2024-05-22 PROCEDURE — 80053 COMPREHEN METABOLIC PANEL: CPT

## 2024-05-22 PROCEDURE — G0103 PSA SCREENING: HCPCS

## 2024-05-22 PROCEDURE — 80061 LIPID PANEL: CPT

## 2024-05-22 PROCEDURE — 82570 ASSAY OF URINE CREATININE: CPT

## 2024-05-30 ENCOUNTER — OFFICE VISIT (OUTPATIENT)
Age: 64
End: 2024-05-30
Payer: COMMERCIAL

## 2024-05-30 VITALS
DIASTOLIC BLOOD PRESSURE: 76 MMHG | HEART RATE: 70 BPM | HEIGHT: 68 IN | SYSTOLIC BLOOD PRESSURE: 132 MMHG | OXYGEN SATURATION: 98 % | WEIGHT: 241 LBS | TEMPERATURE: 98.1 F | BODY MASS INDEX: 36.53 KG/M2

## 2024-05-30 DIAGNOSIS — F41.9 ANXIETY AND DEPRESSION: ICD-10-CM

## 2024-05-30 DIAGNOSIS — Z00.00 ANNUAL PHYSICAL EXAM: Primary | ICD-10-CM

## 2024-05-30 DIAGNOSIS — I10 PRIMARY HYPERTENSION: ICD-10-CM

## 2024-05-30 DIAGNOSIS — N52.01 ERECTILE DYSFUNCTION DUE TO ARTERIAL INSUFFICIENCY: ICD-10-CM

## 2024-05-30 DIAGNOSIS — Z12.11 COLON CANCER SCREENING: ICD-10-CM

## 2024-05-30 DIAGNOSIS — E66.01 CLASS 2 SEVERE OBESITY DUE TO EXCESS CALORIES WITH SERIOUS COMORBIDITY AND BODY MASS INDEX (BMI) OF 36.0 TO 36.9 IN ADULT (HCC): ICD-10-CM

## 2024-05-30 DIAGNOSIS — E78.2 MIXED HYPERLIPIDEMIA: ICD-10-CM

## 2024-05-30 DIAGNOSIS — K21.9 GASTROESOPHAGEAL REFLUX DISEASE WITHOUT ESOPHAGITIS: ICD-10-CM

## 2024-05-30 DIAGNOSIS — F32.A ANXIETY AND DEPRESSION: ICD-10-CM

## 2024-05-30 DIAGNOSIS — E11.9 TYPE 2 DIABETES MELLITUS WITHOUT COMPLICATION, WITHOUT LONG-TERM CURRENT USE OF INSULIN (HCC): ICD-10-CM

## 2024-05-30 PROBLEM — E11.65 UNCONTROLLED TYPE 2 DIABETES MELLITUS WITH HYPERGLYCEMIA (HCC): Status: RESOLVED | Noted: 2022-08-15 | Resolved: 2024-05-30

## 2024-05-30 PROBLEM — M25.532 CHRONIC PAIN OF LEFT WRIST: Status: RESOLVED | Noted: 2023-11-27 | Resolved: 2024-05-30

## 2024-05-30 PROBLEM — U09.9 COVID-19 LONG HAULER MANIFESTING CHRONIC LOSS OF SMELL AND TASTE: Status: RESOLVED | Noted: 2023-02-08 | Resolved: 2024-05-30

## 2024-05-30 PROBLEM — G89.29 CHRONIC PAIN OF LEFT WRIST: Status: RESOLVED | Noted: 2023-11-27 | Resolved: 2024-05-30

## 2024-05-30 PROBLEM — R43.8 COVID-19 LONG HAULER MANIFESTING CHRONIC LOSS OF SMELL AND TASTE: Status: RESOLVED | Noted: 2023-02-08 | Resolved: 2024-05-30

## 2024-05-30 PROCEDURE — 99396 PREV VISIT EST AGE 40-64: CPT | Performed by: NURSE PRACTITIONER

## 2024-05-30 PROCEDURE — 3075F SYST BP GE 130 - 139MM HG: CPT | Performed by: NURSE PRACTITIONER

## 2024-05-30 PROCEDURE — 90471 IMMUNIZATION ADMIN: CPT | Performed by: NURSE PRACTITIONER

## 2024-05-30 PROCEDURE — 90677 PCV20 VACCINE IM: CPT | Performed by: NURSE PRACTITIONER

## 2024-05-30 PROCEDURE — 3078F DIAST BP <80 MM HG: CPT | Performed by: NURSE PRACTITIONER

## 2024-05-30 RX ORDER — OMEPRAZOLE 40 MG/1
40 CAPSULE, DELAYED RELEASE ORAL DAILY PRN
Qty: 100 CAPSULE | Refills: 3 | Status: SHIPPED | OUTPATIENT
Start: 2024-05-30

## 2024-05-30 RX ORDER — AMLODIPINE BESYLATE 10 MG/1
10 TABLET ORAL DAILY
Qty: 100 TABLET | Refills: 1 | Status: SHIPPED | OUTPATIENT
Start: 2024-05-30

## 2024-05-30 RX ORDER — SPIRONOLACTONE 25 MG/1
25 TABLET ORAL DAILY
Qty: 100 TABLET | Refills: 1 | Status: SHIPPED | OUTPATIENT
Start: 2024-05-30

## 2024-05-30 RX ORDER — ATORVASTATIN CALCIUM 40 MG/1
40 TABLET, FILM COATED ORAL DAILY
Qty: 100 TABLET | Refills: 3 | Status: SHIPPED | OUTPATIENT
Start: 2024-05-30

## 2024-05-30 RX ORDER — LOSARTAN POTASSIUM 25 MG/1
25 TABLET ORAL DAILY
Qty: 100 TABLET | Refills: 1 | Status: SHIPPED | OUTPATIENT
Start: 2024-05-30

## 2024-05-30 ASSESSMENT — PATIENT HEALTH QUESTIONNAIRE - PHQ9
10. IF YOU CHECKED OFF ANY PROBLEMS, HOW DIFFICULT HAVE THESE PROBLEMS MADE IT FOR YOU TO DO YOUR WORK, TAKE CARE OF THINGS AT HOME, OR GET ALONG WITH OTHER PEOPLE: NOT DIFFICULT AT ALL
9. THOUGHTS THAT YOU WOULD BE BETTER OFF DEAD, OR OF HURTING YOURSELF: NOT AT ALL
SUM OF ALL RESPONSES TO PHQ QUESTIONS 1-9: 1
8. MOVING OR SPEAKING SO SLOWLY THAT OTHER PEOPLE COULD HAVE NOTICED. OR THE OPPOSITE, BEING SO FIGETY OR RESTLESS THAT YOU HAVE BEEN MOVING AROUND A LOT MORE THAN USUAL: NOT AT ALL
6. FEELING BAD ABOUT YOURSELF - OR THAT YOU ARE A FAILURE OR HAVE LET YOURSELF OR YOUR FAMILY DOWN: NOT AT ALL
SUM OF ALL RESPONSES TO PHQ9 QUESTIONS 1 & 2: 0
3. TROUBLE FALLING OR STAYING ASLEEP: NOT AT ALL
SUM OF ALL RESPONSES TO PHQ QUESTIONS 1-9: 1
1. LITTLE INTEREST OR PLEASURE IN DOING THINGS: NOT AT ALL
SUM OF ALL RESPONSES TO PHQ QUESTIONS 1-9: 1
SUM OF ALL RESPONSES TO PHQ QUESTIONS 1-9: 1
2. FEELING DOWN, DEPRESSED OR HOPELESS: NOT AT ALL
5. POOR APPETITE OR OVEREATING: NOT AT ALL
4. FEELING TIRED OR HAVING LITTLE ENERGY: SEVERAL DAYS

## 2024-05-30 NOTE — ASSESSMENT & PLAN NOTE
Well-controlled, continue current medications  Continue omeprazole as needed  Avoid gut irritants such as spicy foods  Avoid laying down for 30 to 45 minutes after eating  Avoid excessive alcohol consumption

## 2024-05-30 NOTE — PROGRESS NOTES
2 severe obesity due to excess calories with serious comorbidity and body mass index (BMI) of 36.0 to 36.9 in adult ([facility]) 2/8/2023    COVID-19 long hauler manifesting chronic loss of smell and taste 2/8/2023    Erectile dysfunction due to arterial insufficiency 3/6/2019    Gastroesophageal reflux disease without esophagitis 2/8/2023    GERD (gastroesophageal reflux disease)     Mixed hyperlipidemia 11/29/2021    Neuropathy 11/29/2021    DAMON on CPAP     Primary hypertension 3/6/2019    Uncontrolled type 2 diabetes mellitus with hyperglycemia ([facility]) 8/15/2022     Current Outpatient Medications   Medication Sig    metFORMIN (GLUCOPHAGE) 1000 MG tablet Take 1 tablet by mouth 2 times daily (with meals) For diabetes    atorvastatin (LIPITOR) 40 MG tablet Take 1 tablet by mouth daily    amLODIPine (NORVASC) 10 MG tablet Take 1 tablet by mouth daily    losartan (COZAAR) 25 MG tablet Take 1 tablet by mouth daily For blood pressure    omeprazole (PRILOSEC) 40 MG delayed release capsule Take 1 capsule by mouth daily as needed (GERD) For reflux    spironolactone (ALDACTONE) 25 MG tablet Take 1 tablet by mouth daily For blood pressure    ACETAMINOPHEN PO Take by mouth    sildenafil (VIAGRA) 100 MG tablet 1 tablet by mouth at least 1 hour before intercourse     No current facility-administered medications for this visit.       Past Surgical History:   Procedure Laterality Date    HEENT      Ear Tubes    LYMPHADENECTOMY      left axilla    MOHS SURGERY      nose    OTHER SURGICAL HISTORY      Melanoma removed on back      Allergies and Intolerances:   Allergies   Allergen Reactions    Lisinopril Cough     Family History:   Family History   Problem Relation Age of Onset    No Known Problems Sister     No Known Problems Father     Diabetes Mother     High Cholesterol Mother      Social History:   He  reports that he has never smoked. He has never used smokeless tobacco.   Social History     Substance and Sexual Activity   Alcohol Use

## 2024-05-30 NOTE — ASSESSMENT & PLAN NOTE
+4 pounds in 12 months  Continue to work on [employer] exercise regimen  Stay active to burn calories

## 2024-05-30 NOTE — ASSESSMENT & PLAN NOTE
The patient's renal and hepatic functions are within normal parameters. His prostate-specific antigen (PSA) level is 0.9. His triglyceride level is 119, which is below the desired goal of less than 150. His low-density lipoprotein (LDL) cholesterol has decreased from 87 to 50. His A1c has decreased from 5.5 to 6.0. His weight was recorded as 237 in 02/2023 to 241 today. The patient will receive the Prevnar 20 vaccine today, followed by a 2-week interval for the RSV vaccine. A referral for a colonoscopy has been made.

## 2024-05-30 NOTE — ASSESSMENT & PLAN NOTE
Well-controlled, continue current medications  ; LDL 50  Continue statin therapy  Recheck level 12 months    Reduce fried fatty or oily foods in diet  Limit red meats, processed foods, and alcohol.    Limit fast food  Increase physical activity to 60 minutes of moderate intensity aerobic exercise per week.  Increase water intake  Reduce alcohol intake    How to raise HDL if low:  Consume oats, beans, legumes, olive oil, whole grains, nuts, seeds, fatty fish, and berries.  Lose weight/fat  Reduce alcohol consumption  Smoking cessation

## 2024-05-30 NOTE — ASSESSMENT & PLAN NOTE
Well-controlled, continue current medications  A1c 5.5-6.0  Recheck level 6 months  Continue metformin 1000 mg twice daily  Patient inquiring if metformin can be decreased or discontinued  May consider change with metformin as long as A1c is less than 6.  Reduce carbs and sweets in diet  Last diabetic eye exam was fall 2023  Diabetic foot exam completed, no abnormalities noted.

## 2024-06-29 PROBLEM — Z00.00 ANNUAL PHYSICAL EXAM: Status: RESOLVED | Noted: 2023-02-08 | Resolved: 2024-06-29

## 2024-11-15 ENCOUNTER — HOSPITAL ENCOUNTER (OUTPATIENT)
Facility: HOSPITAL | Age: 64
Setting detail: SPECIMEN
Discharge: HOME | End: 2024-11-18
Payer: COMMERCIAL

## 2024-11-15 DIAGNOSIS — E11.9 TYPE 2 DIABETES MELLITUS WITHOUT COMPLICATION, WITHOUT LONG-TERM CURRENT USE OF INSULIN (HCC): ICD-10-CM

## 2024-11-15 DIAGNOSIS — I10 PRIMARY HYPERTENSION: ICD-10-CM

## 2024-11-15 LAB
ANION GAP SERPL CALC-SCNC: 6 MMOL/L (ref 3–18)
BUN SERPL-MCNC: 9 MG/DL (ref 7–18)
BUN/CREAT SERPL: 11 (ref 12–20)
CALCIUM SERPL-MCNC: 9.8 MG/DL (ref 8.5–10.1)
CHLORIDE SERPL-SCNC: 102 MMOL/L (ref 100–111)
CO2 SERPL-SCNC: 29 MMOL/L (ref 21–32)
CREAT SERPL-MCNC: 0.85 MG/DL (ref 0.6–1.3)
EST. AVERAGE GLUCOSE BLD GHB EST-MCNC: 157 MG/DL
GLUCOSE SERPL-MCNC: 137 MG/DL (ref 74–99)
HBA1C MFR BLD: 7.1 % (ref 4.2–5.6)
POTASSIUM SERPL-SCNC: 4 MMOL/L (ref 3.5–5.5)
SODIUM SERPL-SCNC: 137 MMOL/L (ref 136–145)

## 2024-11-15 PROCEDURE — 36415 COLL VENOUS BLD VENIPUNCTURE: CPT

## 2024-11-15 PROCEDURE — 83036 HEMOGLOBIN GLYCOSYLATED A1C: CPT

## 2024-11-15 PROCEDURE — 80048 BASIC METABOLIC PNL TOTAL CA: CPT

## 2024-11-21 ENCOUNTER — OFFICE VISIT (OUTPATIENT)
Facility: CLINIC | Age: 64
End: 2024-11-21
Payer: COMMERCIAL

## 2024-11-21 VITALS
BODY MASS INDEX: 37.13 KG/M2 | OXYGEN SATURATION: 97 % | HEIGHT: 68 IN | DIASTOLIC BLOOD PRESSURE: 82 MMHG | RESPIRATION RATE: 20 BRPM | WEIGHT: 245 LBS | SYSTOLIC BLOOD PRESSURE: 155 MMHG | TEMPERATURE: 98 F | HEART RATE: 80 BPM

## 2024-11-21 DIAGNOSIS — E66.01 CLASS 2 SEVERE OBESITY DUE TO EXCESS CALORIES WITH SERIOUS COMORBIDITY AND BODY MASS INDEX (BMI) OF 36.0 TO 36.9 IN ADULT: ICD-10-CM

## 2024-11-21 DIAGNOSIS — I10 PRIMARY HYPERTENSION: ICD-10-CM

## 2024-11-21 DIAGNOSIS — E66.812 CLASS 2 SEVERE OBESITY DUE TO EXCESS CALORIES WITH SERIOUS COMORBIDITY AND BODY MASS INDEX (BMI) OF 36.0 TO 36.9 IN ADULT: ICD-10-CM

## 2024-11-21 DIAGNOSIS — E11.9 TYPE 2 DIABETES MELLITUS WITHOUT COMPLICATION, WITHOUT LONG-TERM CURRENT USE OF INSULIN (HCC): Primary | ICD-10-CM

## 2024-11-21 PROCEDURE — 3077F SYST BP >= 140 MM HG: CPT | Performed by: NURSE PRACTITIONER

## 2024-11-21 PROCEDURE — 3051F HG A1C>EQUAL 7.0%<8.0%: CPT | Performed by: NURSE PRACTITIONER

## 2024-11-21 PROCEDURE — 3079F DIAST BP 80-89 MM HG: CPT | Performed by: NURSE PRACTITIONER

## 2024-11-21 PROCEDURE — 99214 OFFICE O/P EST MOD 30 MIN: CPT | Performed by: NURSE PRACTITIONER

## 2024-11-21 RX ORDER — LOSARTAN POTASSIUM 50 MG/1
50 TABLET ORAL DAILY
Qty: 90 TABLET | Refills: 0 | Status: SHIPPED | OUTPATIENT
Start: 2024-11-21

## 2024-11-21 RX ORDER — AMLODIPINE BESYLATE 10 MG/1
10 TABLET ORAL DAILY
Qty: 90 TABLET | Refills: 0 | Status: SHIPPED | OUTPATIENT
Start: 2024-11-21

## 2024-11-21 RX ORDER — SPIRONOLACTONE 25 MG/1
25 TABLET ORAL DAILY
Qty: 90 TABLET | Refills: 0 | Status: SHIPPED | OUTPATIENT
Start: 2024-11-21

## 2024-11-21 SDOH — ECONOMIC STABILITY: INCOME INSECURITY: HOW HARD IS IT FOR YOU TO PAY FOR THE VERY BASICS LIKE FOOD, HOUSING, MEDICAL CARE, AND HEATING?: NOT VERY HARD

## 2024-11-21 SDOH — ECONOMIC STABILITY: FOOD INSECURITY: WITHIN THE PAST 12 MONTHS, THE FOOD YOU BOUGHT JUST DIDN'T LAST AND YOU DIDN'T HAVE MONEY TO GET MORE.: NEVER TRUE

## 2024-11-21 SDOH — ECONOMIC STABILITY: TRANSPORTATION INSECURITY
IN THE PAST 12 MONTHS, HAS LACK OF TRANSPORTATION KEPT YOU FROM MEETINGS, WORK, OR FROM GETTING THINGS NEEDED FOR DAILY LIVING?: NO

## 2024-11-21 SDOH — ECONOMIC STABILITY: FOOD INSECURITY: WITHIN THE PAST 12 MONTHS, YOU WORRIED THAT YOUR FOOD WOULD RUN OUT BEFORE YOU GOT MONEY TO BUY MORE.: NEVER TRUE

## 2024-11-21 NOTE — ASSESSMENT & PLAN NOTE
A1c 6.0-7.1  Cont metformin 1000mg BID  Initiate Trulicity 0.75mg weekly x 4 weeks  Initiate just to 1.5 mg x 8 weeks after completion of Trulicity 0.75  He was advised to reduce his food intake to avoid nausea, a potential side effect of Trulicity.  If there are no issues with obtaining Trulicity, metformin may be discontinued, and Trulicity continued for weight management.   José Miguel POC A1c 3m    5/30/2024 ov note:   Well-controlled, continue current medications  A1c 5.5-6.0  Recheck level 6 months  Continue metformin 1000 mg twice daily  Patient inquiring if metformin can be decreased or discontinued  May consider change with metformin as long as A1c is less than 6.  Reduce carbs and sweets in diet  Last diabetic eye exam was fall 2023  Diabetic foot exam completed, no abnormalities noted.

## 2024-11-21 NOTE — PROGRESS NOTES
Mckay Samson is a 64 y.o. male (: 1960) presenting to address:    Chief Complaint   Patient presents with    6 Month Follow-Up       Vitals:    24 1444   BP: (!) 155/82   Pulse: 80   Resp: 20   Temp: 98 °F (36.7 °C)   SpO2: 97%       \"Have you been to the ER, urgent care clinic since your last visit?  Hospitalized since your last visit?\"    NO    “Have you seen or consulted any other health care providers outside of [facility] since your last visit?”    NO    “Have you had a colorectal cancer screening such as a colonoscopy/FIT/Cologuard?    NO    Date of last Colonoscopy: 2014  No cologuard on file  No FIT/FOBT on file   No flexible sigmoidoscopy on file              
specialist  DDx: Arthritis Carpal tunnel    Class 2 severe obesity due to excess calories with serious comorbidity and body mass index (BMI) of 36.0 to 36.9 in adult 2/8/2023    COVID-19 long hauler manifesting chronic loss of smell and taste 2/8/2023    Erectile dysfunction due to arterial insufficiency 3/6/2019    Gastroesophageal reflux disease without esophagitis 2/8/2023    GERD (gastroesophageal reflux disease)     Mixed hyperlipidemia 11/29/2021    Neuropathy 11/29/2021    DAMON on CPAP     Primary hypertension 3/6/2019    Uncontrolled type 2 diabetes mellitus with hyperglycemia (HCC) 8/15/2022     Current Outpatient Medications   Medication Sig    dulaglutide (TRULICITY) 0.75 MG/0.5ML SOAJ SC injection Inject 0.5 mLs into the skin once a week    [START ON 12/20/2024] dulaglutide (TRULICITY) 1.5 MG/0.5ML SC injection Inject 0.5 mLs into the skin once a week    metFORMIN (GLUCOPHAGE) 1000 MG tablet Take 1 tablet by mouth 2 times daily (with meals) For diabetes    amLODIPine (NORVASC) 10 MG tablet Take 1 tablet by mouth daily    losartan (COZAAR) 50 MG tablet Take 1 tablet by mouth daily For blood pressure and kidney protection    spironolactone (ALDACTONE) 25 MG tablet Take 1 tablet by mouth daily For blood pressure    atorvastatin (LIPITOR) 40 MG tablet Take 1 tablet by mouth daily    omeprazole (PRILOSEC) 40 MG delayed release capsule Take 1 capsule by mouth daily as needed (GERD) For reflux    ACETAMINOPHEN PO Take by mouth    sildenafil (VIAGRA) 100 MG tablet 1 tablet by mouth at least 1 hour before intercourse     No current facility-administered medications for this visit.       Past Surgical History:   Procedure Laterality Date    HEENT      Ear Tubes    LYMPHADENECTOMY      left axilla    MOHS SURGERY      nose    OTHER SURGICAL HISTORY      Melanoma removed on back      Allergies and Intolerances:   Allergies   Allergen Reactions    Lisinopril Cough     Family History:   Family History   Problem

## 2024-11-22 ENCOUNTER — TELEPHONE (OUTPATIENT)
Facility: CLINIC | Age: 64
End: 2024-11-22

## 2024-11-22 NOTE — ASSESSMENT & PLAN NOTE
BP elevated today and has been elevated previously per patient  Increase losartan 25 mg to 2 tabs daily till all gone  Initiate losartan 50 mg daily once he has exhausted losartan 25  Continue amlodipine 10 mg daily  Continue spironolactone 25 mg daily  BMP WNL    Limit sodium in diet to 2g/d  Initiate cardio exercise  Weight reduction  Increase water intake  Check BP and report if 3 consecutive readings greater than 139/89 to office

## 2024-11-22 NOTE — TELEPHONE ENCOUNTER
Pt explained that he was started on low dosage (0.75MG) of Trulicity and on the 2nd and 3rd month, his dosage would increase to 1.5 MG. Pt went to pharmacy and the higher dosage was ready for pickup, but the low dosage was rejected. Pt was told by the pharmacy that he needed to get prior authorization from his pcp for the lower dosage. Advised pt that the lower dosage is showing as received by the pharmacy as of 11/21 at 3:14PM. Pt also contacted his insurance and given a number to call for [facility] [phone] and was told that the DUR code for the lower dose was rejected and to override this. Pt requested a follow up call. Please advise.

## 2024-11-22 NOTE — ASSESSMENT & PLAN NOTE
+4 pounds in 6 months  BMI is out of normal parameters and plan is as follows: Discussed in great detail on diet, portion control, exercise, avoiding foods high in sugar, carbs and starches, fatty/greasy foods and to eat until satisfied not til full. I have counseled this patient on diet and exercise regimens as well.

## 2024-12-01 ENCOUNTER — PATIENT MESSAGE (OUTPATIENT)
Facility: CLINIC | Age: 64
End: 2024-12-01

## 2024-12-03 ENCOUNTER — TELEPHONE (OUTPATIENT)
Facility: CLINIC | Age: 64
End: 2024-12-03

## 2024-12-04 NOTE — TELEPHONE ENCOUNTER
Received fax from pt's insurance stating that PA was not needed at this time. Spoke w/ [facility] pharmacy to clarify and per pharmacist he is unsure on what the patient was referring to they never processed a refill for the trulicity, however they received it. Pharmacist ran the refill for the .75 mg and was successful. Medication will be ordered and should arrive at the pharmacy on this Friday 12/6/2024. Pt will be notified by the pharmacy.

## 2024-12-23 DIAGNOSIS — I10 PRIMARY HYPERTENSION: ICD-10-CM

## 2024-12-23 RX ORDER — SPIRONOLACTONE 25 MG/1
25 TABLET ORAL DAILY
Qty: 90 TABLET | Refills: 0 | Status: SHIPPED | OUTPATIENT
Start: 2024-12-23

## 2024-12-23 NOTE — TELEPHONE ENCOUNTER
PCP: Bonny Tripp DNP    Refill Request     LAST OFFICE VISIT:      Medication: spironolactone (ALDACTONE) 25 MG table     Dispense:       Pharmacy [facility] - [address] [phone] - F [phone] [[id number]]       Future Appointments   Date Time Provider Department Center   2/17/2025  2:15 PM Bonny Tripp DNP [facility]

## 2025-01-22 DIAGNOSIS — I10 PRIMARY HYPERTENSION: ICD-10-CM

## 2025-01-22 DIAGNOSIS — E11.9 TYPE 2 DIABETES MELLITUS WITHOUT COMPLICATION, WITHOUT LONG-TERM CURRENT USE OF INSULIN (HCC): ICD-10-CM

## 2025-01-22 RX ORDER — LOSARTAN POTASSIUM 50 MG/1
50 TABLET ORAL DAILY
Qty: 90 TABLET | Refills: 0 | Status: SHIPPED | OUTPATIENT
Start: 2025-01-22

## 2025-01-22 RX ORDER — AMLODIPINE BESYLATE 10 MG/1
10 TABLET ORAL DAILY
Qty: 90 TABLET | Refills: 0 | Status: SHIPPED | OUTPATIENT
Start: 2025-01-22

## 2025-02-17 ENCOUNTER — OFFICE VISIT (OUTPATIENT)
Facility: CLINIC | Age: 65
End: 2025-02-17
Payer: COMMERCIAL

## 2025-02-17 VITALS
SYSTOLIC BLOOD PRESSURE: 140 MMHG | WEIGHT: 239 LBS | TEMPERATURE: 98.4 F | RESPIRATION RATE: 18 BRPM | HEIGHT: 68 IN | OXYGEN SATURATION: 97 % | BODY MASS INDEX: 36.22 KG/M2 | HEART RATE: 77 BPM | DIASTOLIC BLOOD PRESSURE: 82 MMHG

## 2025-02-17 DIAGNOSIS — E11.9 TYPE 2 DIABETES MELLITUS WITHOUT COMPLICATION, WITHOUT LONG-TERM CURRENT USE OF INSULIN (HCC): Primary | ICD-10-CM

## 2025-02-17 DIAGNOSIS — Z12.11 COLON CANCER SCREENING: ICD-10-CM

## 2025-02-17 DIAGNOSIS — I10 PRIMARY HYPERTENSION: ICD-10-CM

## 2025-02-17 LAB — HBA1C MFR BLD: 6.3 %

## 2025-02-17 PROCEDURE — 99214 OFFICE O/P EST MOD 30 MIN: CPT | Performed by: NURSE PRACTITIONER

## 2025-02-17 PROCEDURE — 83036 HEMOGLOBIN GLYCOSYLATED A1C: CPT | Performed by: NURSE PRACTITIONER

## 2025-02-17 PROCEDURE — 3077F SYST BP >= 140 MM HG: CPT | Performed by: NURSE PRACTITIONER

## 2025-02-17 PROCEDURE — 3044F HG A1C LEVEL LT 7.0%: CPT | Performed by: NURSE PRACTITIONER

## 2025-02-17 PROCEDURE — 3079F DIAST BP 80-89 MM HG: CPT | Performed by: NURSE PRACTITIONER

## 2025-02-17 RX ORDER — SPIRONOLACTONE 25 MG/1
25 TABLET ORAL DAILY
Qty: 90 TABLET | Refills: 0 | Status: SHIPPED | OUTPATIENT
Start: 2025-02-17

## 2025-02-17 RX ORDER — LOSARTAN POTASSIUM 100 MG/1
100 TABLET ORAL DAILY
Qty: 90 TABLET | Refills: 0 | Status: SHIPPED | OUTPATIENT
Start: 2025-02-17

## 2025-02-17 RX ORDER — AMLODIPINE BESYLATE 10 MG/1
10 TABLET ORAL DAILY
Qty: 90 TABLET | Refills: 0 | Status: SHIPPED | OUTPATIENT
Start: 2025-02-17

## 2025-02-17 SDOH — ECONOMIC STABILITY: INCOME INSECURITY: IN THE LAST 12 MONTHS, WAS THERE A TIME WHEN YOU WERE NOT ABLE TO PAY THE MORTGAGE OR RENT ON TIME?: NO

## 2025-02-17 SDOH — ECONOMIC STABILITY: FOOD INSECURITY: WITHIN THE PAST 12 MONTHS, YOU WORRIED THAT YOUR FOOD WOULD RUN OUT BEFORE YOU GOT MONEY TO BUY MORE.: NEVER TRUE

## 2025-02-17 SDOH — ECONOMIC STABILITY: TRANSPORTATION INSECURITY
IN THE PAST 12 MONTHS, HAS THE LACK OF TRANSPORTATION KEPT YOU FROM MEDICAL APPOINTMENTS OR FROM GETTING MEDICATIONS?: NO

## 2025-02-17 SDOH — ECONOMIC STABILITY: FOOD INSECURITY: WITHIN THE PAST 12 MONTHS, THE FOOD YOU BOUGHT JUST DIDN'T LAST AND YOU DIDN'T HAVE MONEY TO GET MORE.: NEVER TRUE

## 2025-02-17 ASSESSMENT — PATIENT HEALTH QUESTIONNAIRE - PHQ9
5. POOR APPETITE OR OVEREATING: NOT AT ALL
3. TROUBLE FALLING OR STAYING ASLEEP: NOT AT ALL
SUM OF ALL RESPONSES TO PHQ QUESTIONS 1-9: 0
7. TROUBLE CONCENTRATING ON THINGS, SUCH AS READING THE NEWSPAPER OR WATCHING TELEVISION: NOT AT ALL
6. FEELING BAD ABOUT YOURSELF - OR THAT YOU ARE A FAILURE OR HAVE LET YOURSELF OR YOUR FAMILY DOWN: NOT AT ALL
1. LITTLE INTEREST OR PLEASURE IN DOING THINGS: NOT AT ALL
SUM OF ALL RESPONSES TO PHQ QUESTIONS 1-9: 0
SUM OF ALL RESPONSES TO PHQ QUESTIONS 1-9: 0
9. THOUGHTS THAT YOU WOULD BE BETTER OFF DEAD, OR OF HURTING YOURSELF: NOT AT ALL
SUM OF ALL RESPONSES TO PHQ9 QUESTIONS 1 & 2: 0
SUM OF ALL RESPONSES TO PHQ QUESTIONS 1-9: 0
2. FEELING DOWN, DEPRESSED OR HOPELESS: NOT AT ALL
10. IF YOU CHECKED OFF ANY PROBLEMS, HOW DIFFICULT HAVE THESE PROBLEMS MADE IT FOR YOU TO DO YOUR WORK, TAKE CARE OF THINGS AT HOME, OR GET ALONG WITH OTHER PEOPLE: NOT DIFFICULT AT ALL
4. FEELING TIRED OR HAVING LITTLE ENERGY: NOT AT ALL
8. MOVING OR SPEAKING SO SLOWLY THAT OTHER PEOPLE COULD HAVE NOTICED. OR THE OPPOSITE, BEING SO FIGETY OR RESTLESS THAT YOU HAVE BEEN MOVING AROUND A LOT MORE THAN USUAL: NOT AT ALL

## 2025-02-17 NOTE — ASSESSMENT & PLAN NOTE
POC A1c 6.3  Continue metformin 1000 mg twice daily  DC Trulicity 1.5 mg  Initiate Trulicity 3 mg weekly  He is advised to continue monitoring his blood sugar levels and maintain a balanced diet.   Recheck A1c 3 months    11/22/2024:  A1c 6.0-7.1  Cont metformin 1000mg BID  Initiate Trulicity 0.75mg weekly x 4 weeks  Initiate just to 1.5 mg x 8 weeks after completion of Trulicity 0.75  He was advised to reduce his food intake to avoid nausea, a potential side effect of Trulicity.  If there are no issues with obtaining Trulicity, metformin may be discontinued, and Trulicity continued for weight management.   José Miguel POC A1c 3m    5/30/2024 ov note:  A1c 5.5-6.0  Recheck level 6 months  Continue metformin 1000 mg twice daily  Patient inquiring if metformin can be decreased or discontinued  May consider change with metformin as long as A1c is less than 6.  Reduce carbs and sweets in diet  Last diabetic eye exam was fall 2023  Diabetic foot exam completed, no abnormalities noted.

## 2025-02-17 NOTE — PROGRESS NOTES
[facility] [address]  [address]  [place] [id number]  [phone] office/[phone] fax    2/17/2025    Mckay Samson 1960 is a pleasant White (non-) male.       History of Present Illness  The patient presents for evaluation of diabetes, hypertension, and weight management.    He expresses concern regarding his elevated blood pressure, which has consistently been in the 150s on 3 separate occasions. He also reports a prolonged recovery period for his heart rate following exercise. He is not experiencing any edema. His current medication regimen includes losartan 50 mg, amlodipine, and spironolactone.    He reports a minimal weight loss of only a few pounds, which he finds disappointing. He has made modifications to his dietary habits and has reduced his intake of sweets, although he has not completely eliminated them from his diet. He is currently on Trulicity 1.5 mg and is considering an increase in dosage to further aid his weight loss efforts and to reduce his A1c. He does not monitor his blood glucose levels at home and believes his A1c has decreased to the 6 range. He has experienced occasional discomfort after overeating but has not had any severe symptoms with the Trulicity. He has 2 doses of Trulicity 1.5 mg remaining and is curious about the availability of Trulicity through mail order.     He has not yet undergone a colonoscopy.    Supplemental Information  He has experienced intermittent pain, which he attributes to inadequate water intake.    MEDICATIONS  Current: Trulicity, metformin, losartan, amlodipine, spironolactone      Physical Exam      Physical Exam  Constitutional:       Appearance: Normal appearance. He is obese.   Eyes:      Extraocular Movements: Extraocular movements intact.      Conjunctiva/sclera: Conjunctivae normal.   Cardiovascular:      Rate and Rhythm: Normal rate and regular rhythm.      Heart sounds: Normal heart sounds.

## 2025-02-17 NOTE — PROGRESS NOTES
Mckay Samson is a 65 y.o. male (: 1960) presenting to address:    Chief Complaint   Patient presents with    3 Month Follow-Up    Diabetes       Vitals:    25 1444   BP: (!) 140/82   Pulse:    Resp:    Temp:    SpO2:        \"Have you been to the ER, urgent care clinic since your last visit?  Hospitalized since your last visit?\"    NO    “Have you seen or consulted any other health care providers outside of [facility] since your last visit?”    NO    “Have you had a colorectal cancer screening such as a colonoscopy/FIT/Cologuard?    NO    Date of last Colonoscopy: 2014  No cologuard on file  No FIT/FOBT on file   No flexible sigmoidoscopy on file

## 2025-02-17 NOTE — ASSESSMENT & PLAN NOTE
BP remains elevated today  DC losartan 50 mg  Initiate losartan 100 mg daily  Continue amlodipine 10 mg daily  Continue spironolactone 25 mg daily    He is advised to monitor his blood pressure regularly and maintain a healthy lifestyle, including regular exercise and a balanced diet.    Limit sodium in diet to 2g/d  Initiate cardio exercise  Weight reduction  Increase water intake  Check BP and report if 3 consecutive readings greater than 139/89 to office  Follow-up 3 months

## 2025-05-13 ENCOUNTER — HOSPITAL ENCOUNTER (OUTPATIENT)
Facility: HOSPITAL | Age: 65
Setting detail: SPECIMEN
Discharge: HOME | End: 2025-05-16
Payer: COMMERCIAL

## 2025-05-13 DIAGNOSIS — E11.9 TYPE 2 DIABETES MELLITUS WITHOUT COMPLICATION, WITHOUT LONG-TERM CURRENT USE OF INSULIN (HCC): ICD-10-CM

## 2025-05-13 DIAGNOSIS — I10 PRIMARY HYPERTENSION: ICD-10-CM

## 2025-05-13 LAB
ANION GAP SERPL CALC-SCNC: 14 MMOL/L (ref 3–18)
BUN SERPL-MCNC: 9 MG/DL (ref 6–23)
BUN/CREAT SERPL: 11 (ref 12–20)
CALCIUM SERPL-MCNC: 10.1 MG/DL (ref 8.5–10.1)
CHLORIDE SERPL-SCNC: 103 MMOL/L (ref 98–107)
CO2 SERPL-SCNC: 25 MMOL/L (ref 21–32)
CREAT SERPL-MCNC: 0.77 MG/DL (ref 0.6–1.3)
EST. AVERAGE GLUCOSE BLD GHB EST-MCNC: 126 MG/DL
GLUCOSE SERPL-MCNC: 98 MG/DL (ref 74–108)
HBA1C MFR BLD: 6 % (ref 4.2–5.6)
POTASSIUM SERPL-SCNC: 4.5 MMOL/L (ref 3.5–5.5)
SODIUM SERPL-SCNC: 142 MMOL/L (ref 136–145)

## 2025-05-13 PROCEDURE — 36415 COLL VENOUS BLD VENIPUNCTURE: CPT

## 2025-05-13 PROCEDURE — 80048 BASIC METABOLIC PNL TOTAL CA: CPT

## 2025-05-13 PROCEDURE — 83036 HEMOGLOBIN GLYCOSYLATED A1C: CPT

## 2025-05-20 ENCOUNTER — OFFICE VISIT (OUTPATIENT)
Facility: CLINIC | Age: 65
End: 2025-05-20
Payer: COMMERCIAL

## 2025-05-20 ENCOUNTER — RESULTS FOLLOW-UP (OUTPATIENT)
Facility: CLINIC | Age: 65
End: 2025-05-20

## 2025-05-20 VITALS
DIASTOLIC BLOOD PRESSURE: 78 MMHG | SYSTOLIC BLOOD PRESSURE: 146 MMHG | TEMPERATURE: 98.5 F | HEIGHT: 68 IN | WEIGHT: 235 LBS | BODY MASS INDEX: 35.61 KG/M2 | OXYGEN SATURATION: 97 % | RESPIRATION RATE: 20 BRPM | HEART RATE: 76 BPM

## 2025-05-20 DIAGNOSIS — E66.01 CLASS 2 SEVERE OBESITY DUE TO EXCESS CALORIES WITH SERIOUS COMORBIDITY AND BODY MASS INDEX (BMI) OF 36.0 TO 36.9 IN ADULT (HCC): ICD-10-CM

## 2025-05-20 DIAGNOSIS — E66.812 CLASS 2 SEVERE OBESITY DUE TO EXCESS CALORIES WITH SERIOUS COMORBIDITY AND BODY MASS INDEX (BMI) OF 36.0 TO 36.9 IN ADULT (HCC): ICD-10-CM

## 2025-05-20 DIAGNOSIS — I10 PRIMARY HYPERTENSION: Primary | ICD-10-CM

## 2025-05-20 DIAGNOSIS — K21.9 GASTROESOPHAGEAL REFLUX DISEASE WITHOUT ESOPHAGITIS: ICD-10-CM

## 2025-05-20 DIAGNOSIS — E78.2 MIXED HYPERLIPIDEMIA: ICD-10-CM

## 2025-05-20 DIAGNOSIS — E11.9 TYPE 2 DIABETES MELLITUS WITHOUT COMPLICATION, WITHOUT LONG-TERM CURRENT USE OF INSULIN (HCC): ICD-10-CM

## 2025-05-20 DIAGNOSIS — Z12.5 SPECIAL SCREENING FOR MALIGNANT NEOPLASM OF PROSTATE: ICD-10-CM

## 2025-05-20 PROCEDURE — 99214 OFFICE O/P EST MOD 30 MIN: CPT | Performed by: NURSE PRACTITIONER

## 2025-05-20 PROCEDURE — 3078F DIAST BP <80 MM HG: CPT | Performed by: NURSE PRACTITIONER

## 2025-05-20 PROCEDURE — 3077F SYST BP >= 140 MM HG: CPT | Performed by: NURSE PRACTITIONER

## 2025-05-20 PROCEDURE — 3044F HG A1C LEVEL LT 7.0%: CPT | Performed by: NURSE PRACTITIONER

## 2025-05-20 RX ORDER — OMEPRAZOLE 40 MG/1
40 CAPSULE, DELAYED RELEASE ORAL DAILY PRN
Qty: 100 CAPSULE | Refills: 3 | Status: SHIPPED | OUTPATIENT
Start: 2025-05-20

## 2025-05-20 RX ORDER — LOSARTAN POTASSIUM 100 MG/1
100 TABLET ORAL DAILY
Qty: 90 TABLET | Refills: 0 | Status: SHIPPED | OUTPATIENT
Start: 2025-05-20

## 2025-05-20 RX ORDER — ATORVASTATIN CALCIUM 40 MG/1
40 TABLET, FILM COATED ORAL DAILY
Qty: 90 TABLET | Refills: 0 | Status: SHIPPED | OUTPATIENT
Start: 2025-05-20

## 2025-05-20 RX ORDER — AMLODIPINE BESYLATE 10 MG/1
10 TABLET ORAL DAILY
Qty: 90 TABLET | Refills: 0 | Status: SHIPPED | OUTPATIENT
Start: 2025-05-20

## 2025-05-20 RX ORDER — SPIRONOLACTONE 50 MG/1
50 TABLET, FILM COATED ORAL DAILY
Qty: 90 TABLET | Refills: 0 | Status: SHIPPED | OUTPATIENT
Start: 2025-05-20

## 2025-05-20 RX ORDER — AMLODIPINE BESYLATE 10 MG/1
10 TABLET ORAL DAILY
Qty: 90 TABLET | Refills: 0 | Status: SHIPPED | OUTPATIENT
Start: 2025-05-20 | End: 2025-05-20 | Stop reason: SDUPTHER

## 2025-05-20 NOTE — PROGRESS NOTES
Mckay Samson is a 65 y.o. male (: 1960) presenting to address:    Chief Complaint   Patient presents with    3 Month Follow-Up       Vitals:    25 1451   BP: (!) 146/78   Pulse: 76   Resp: 20   Temp: 98.5 °F (36.9 °C)   SpO2: 97%       \"Have you been to the ER, urgent care clinic since your last visit?  Hospitalized since your last visit?\"    NO    “Have you seen or consulted any other health care providers outside of [facility] since your last visit?”    Yes, tooth extraction    “Have you had a colorectal cancer screening such as a colonoscopy/FIT/Cologuard?    NO    Date of last Colonoscopy: 2014  No cologuard on file  No FIT/FOBT on file   No flexible sigmoidoscopy on file

## 2025-05-20 NOTE — PROGRESS NOTES
[facility] [address]  [address]  [place] [id number]  [phone] office/[phone] fax    5/20/2025    Mckay Samson 1960 is a pleasant White (non-) male.       History of Present Illness  The patient presents for evaluation of diabetes and hypertension.    He has not been monitoring his blood glucose levels at home. During his last visit, the dosage of Trulicity was increased from 1.5 mg to 3 mg, resulting in a decrease in his A1c from 7.1 to 6.0. Despite this improvement, he feels that the current dosage is insufficient in managing his condition and expresses a desire to increase it further. He reports a lack of significant weight loss, with a decrease of only 4 pounds since his last visit, from 239 lbs to 235 lbs. He acknowledges a tendency towards overeating but notes that physical activity helps reduce his appetite. His current medication regimen includes Trulicity 3 mg and metformin 1000 mg twice daily.    He has been monitoring his blood pressure at home, which was recorded as 140 during a recent dental visit. He is making efforts to increase his water intake.     His current medication regimen includes amlodipine, losartan, and spironolactone. He recalls a previous adjustment in his medication, where losartan was increased from 50 mg to 100 mg, but has not seen significant changes in his blood pressure.      Physical Exam      Physical Exam  Constitutional:       Appearance: Normal appearance. He is obese.   Eyes:      Extraocular Movements: Extraocular movements intact.      Conjunctiva/sclera: Conjunctivae normal.   Cardiovascular:      Rate and Rhythm: Normal rate and regular rhythm.      Heart sounds: Normal heart sounds.   Pulmonary:      Effort: Pulmonary effort is normal.      Breath sounds: Normal breath sounds.   Musculoskeletal:         General: Normal range of motion.   Skin:     General: Skin is dry.   Neurological:      Mental Status: He

## 2025-05-21 NOTE — ASSESSMENT & PLAN NOTE
- A1c has improved from 7.1 to 6.0 after increasing Trulicity from 1.5 mg to 3 mg.  - DC Trulicity 3 mg  - Initiate Trulicity 4.5 mg to aid in weight loss and further control blood sugar levels.   - Advised to monitor for symptoms of hypoglycemia and report any issues.  - Continue metformin 1000 mg twice daily   - Check A1c and microalbumin in 3 months    2/17/2025:  POC A1c 6.3  Continue metformin 1000 mg twice daily  DC Trulicity 1.5 mg  Initiate Trulicity 3 mg weekly  He is advised to continue monitoring his blood sugar levels and maintain a balanced diet.   Recheck A1c 3 months    11/22/2024:  A1c 6.0-7.1  Cont metformin 1000mg BID  Initiate Trulicity 0.75mg weekly x 4 weeks  Initiate just to 1.5 mg x 8 weeks after completion of Trulicity 0.75  He was advised to reduce his food intake to avoid nausea, a potential side effect of Trulicity.  If there are no issues with obtaining Trulicity, metformin may be discontinued, and Trulicity continued for weight management.   José Miguel POC A1c 3m    5/30/2024 ov note:  A1c 5.5-6.0  Recheck level 6 months  Continue metformin 1000 mg twice daily  Patient inquiring if metformin can be decreased or discontinued  May consider change with metformin as long as A1c is less than 6.  Reduce carbs and sweets in diet  Last diabetic eye exam was fall 2023  Diabetic foot exam completed, no abnormalities noted.

## 2025-05-21 NOTE — ASSESSMENT & PLAN NOTE
- Blood pressure remains elevated despite previous adjustments.  - Continue losartan 100 mg daily   - Continue amlodipine 10 mg daily   - Discontinue spironolactone 25 mg   - Initiate spironolactone 50 mg daily   - BMP WNL  - Refills for amlodipine, spironolactone and losartan will be sent to [facility].  - Advised to continue monitoring blood pressure at home and report any significant changes.  - Recheck BMP 3 months    2/17/2025:  BP remains elevated today  DC losartan 50 mg  Initiate losartan 100 mg daily  Continue amlodipine 10 mg daily  Continue spironolactone 25 mg daily    He is advised to monitor his blood pressure regularly and maintain a healthy lifestyle, including regular exercise and a balanced diet.    Limit sodium in diet to 2g/d  Initiate cardio exercise  Weight reduction  Increase water intake  Check BP and report if 3 consecutive readings greater than 139/89 to office  Follow-up 3 months

## 2025-08-11 ENCOUNTER — HOSPITAL ENCOUNTER (OUTPATIENT)
Facility: HOSPITAL | Age: 65
Setting detail: SPECIMEN
Discharge: HOME | End: 2025-08-14
Payer: COMMERCIAL

## 2025-08-11 DIAGNOSIS — I10 PRIMARY HYPERTENSION: ICD-10-CM

## 2025-08-11 DIAGNOSIS — Z12.5 SPECIAL SCREENING FOR MALIGNANT NEOPLASM OF PROSTATE: ICD-10-CM

## 2025-08-11 DIAGNOSIS — E11.9 TYPE 2 DIABETES MELLITUS WITHOUT COMPLICATION, WITHOUT LONG-TERM CURRENT USE OF INSULIN (HCC): ICD-10-CM

## 2025-08-11 DIAGNOSIS — E78.2 MIXED HYPERLIPIDEMIA: ICD-10-CM

## 2025-08-11 LAB
ALBUMIN SERPL-MCNC: 4.4 G/DL (ref 3.4–5)
ALBUMIN/GLOB SERPL: 1.7 (ref 0.8–1.7)
ALP SERPL-CCNC: 84 U/L (ref 45–117)
ALT SERPL-CCNC: 26 U/L (ref 10–50)
ANION GAP SERPL CALC-SCNC: 14 MMOL/L (ref 3–18)
AST SERPL-CCNC: 15 U/L (ref 10–38)
BASOPHILS # BLD: 0.05 K/UL (ref 0–0.1)
BASOPHILS NFR BLD: 0.5 % (ref 0–2)
BILIRUB SERPL-MCNC: 0.5 MG/DL (ref 0.2–1)
BUN SERPL-MCNC: 9 MG/DL (ref 6–23)
BUN/CREAT SERPL: 11 (ref 12–20)
CALCIUM SERPL-MCNC: 10.3 MG/DL (ref 8.5–10.1)
CHLORIDE SERPL-SCNC: 105 MMOL/L (ref 98–107)
CHOLEST SERPL-MCNC: 166 MG/DL
CO2 SERPL-SCNC: 24 MMOL/L (ref 21–32)
CREAT SERPL-MCNC: 0.78 MG/DL (ref 0.6–1.3)
CREAT UR-MCNC: 132 MG/DL (ref 30–125)
DIFFERENTIAL METHOD BLD: ABNORMAL
EOSINOPHIL # BLD: 0.17 K/UL (ref 0–0.4)
EOSINOPHIL NFR BLD: 1.5 % (ref 0–5)
ERYTHROCYTE [DISTWIDTH] IN BLOOD BY AUTOMATED COUNT: 13.1 % (ref 11.6–14.5)
EST. AVERAGE GLUCOSE BLD GHB EST-MCNC: 124 MG/DL
GLOBULIN SER CALC-MCNC: 2.6 G/DL (ref 2–4)
GLUCOSE SERPL-MCNC: 93 MG/DL (ref 74–108)
HBA1C MFR BLD: 6 % (ref 4.2–5.6)
HCT VFR BLD AUTO: 43.2 % (ref 36–48)
HDLC SERPL-MCNC: 47 MG/DL (ref 40–60)
HDLC SERPL: 3.5 (ref 0–5)
HGB BLD-MCNC: 14.7 G/DL (ref 13–16)
IMM GRANULOCYTES # BLD AUTO: 0.09 K/UL (ref 0–0.04)
IMM GRANULOCYTES NFR BLD AUTO: 0.8 % (ref 0–0.5)
LDLC SERPL CALC-MCNC: 96 MG/DL (ref 0–100)
LYMPHOCYTES # BLD: 3.04 K/UL (ref 0.9–3.6)
LYMPHOCYTES NFR BLD: 27.4 % (ref 21–52)
MCH RBC QN AUTO: 29.1 PG (ref 24–34)
MCHC RBC AUTO-ENTMCNC: 34 G/DL (ref 31–37)
MCV RBC AUTO: 85.5 FL (ref 78–100)
MICROALBUMIN UR-MCNC: <1.2 MG/DL (ref 0–3)
MICROALBUMIN/CREAT UR-RTO: ABNORMAL MG/G (ref 0–30)
MONOCYTES # BLD: 0.71 K/UL (ref 0.05–1.2)
MONOCYTES NFR BLD: 6.4 % (ref 3–10)
NEUTS SEG # BLD: 7.04 K/UL (ref 1.8–8)
NEUTS SEG NFR BLD: 63.4 % (ref 40–73)
NRBC # BLD: 0 K/UL (ref 0–0.01)
NRBC BLD-RTO: 0 PER 100 WBC
PLATELET # BLD AUTO: 302 K/UL (ref 135–420)
PMV BLD AUTO: 10.7 FL (ref 9.2–11.8)
POTASSIUM SERPL-SCNC: 4.2 MMOL/L (ref 3.5–5.5)
PROT SERPL-MCNC: 6.9 G/DL (ref 6.4–8.2)
PSA SERPL-MCNC: 0.9 NG/ML (ref 1.4–4.4)
RBC # BLD AUTO: 5.05 M/UL (ref 4.35–5.65)
SODIUM SERPL-SCNC: 142 MMOL/L (ref 136–145)
TRIGL SERPL-MCNC: 117 MG/DL (ref 0–150)
VLDLC SERPL CALC-MCNC: 23 MG/DL
WBC # BLD AUTO: 11.1 K/UL (ref 4.6–13.2)

## 2025-08-11 PROCEDURE — 82043 UR ALBUMIN QUANTITATIVE: CPT

## 2025-08-11 PROCEDURE — 85025 COMPLETE CBC W/AUTO DIFF WBC: CPT

## 2025-08-11 PROCEDURE — 36415 COLL VENOUS BLD VENIPUNCTURE: CPT

## 2025-08-11 PROCEDURE — 82570 ASSAY OF URINE CREATININE: CPT

## 2025-08-11 PROCEDURE — 83036 HEMOGLOBIN GLYCOSYLATED A1C: CPT

## 2025-08-11 PROCEDURE — 80061 LIPID PANEL: CPT

## 2025-08-11 PROCEDURE — G0103 PSA SCREENING: HCPCS

## 2025-08-11 PROCEDURE — 80053 COMPREHEN METABOLIC PANEL: CPT

## 2025-08-19 ENCOUNTER — OFFICE VISIT (OUTPATIENT)
Facility: CLINIC | Age: 65
End: 2025-08-19
Payer: COMMERCIAL

## 2025-08-19 VITALS
TEMPERATURE: 98.2 F | RESPIRATION RATE: 17 BRPM | BODY MASS INDEX: 35.61 KG/M2 | HEART RATE: 80 BPM | HEIGHT: 68 IN | WEIGHT: 235 LBS | SYSTOLIC BLOOD PRESSURE: 115 MMHG | DIASTOLIC BLOOD PRESSURE: 67 MMHG | OXYGEN SATURATION: 97 %

## 2025-08-19 DIAGNOSIS — E66.01 CLASS 2 SEVERE OBESITY DUE TO EXCESS CALORIES WITH SERIOUS COMORBIDITY AND BODY MASS INDEX (BMI) OF 36.0 TO 36.9 IN ADULT (HCC): ICD-10-CM

## 2025-08-19 DIAGNOSIS — E83.52 HYPERCALCEMIA: ICD-10-CM

## 2025-08-19 DIAGNOSIS — E11.9 TYPE 2 DIABETES MELLITUS WITHOUT COMPLICATION, WITHOUT LONG-TERM CURRENT USE OF INSULIN (HCC): ICD-10-CM

## 2025-08-19 DIAGNOSIS — E66.812 CLASS 2 SEVERE OBESITY DUE TO EXCESS CALORIES WITH SERIOUS COMORBIDITY AND BODY MASS INDEX (BMI) OF 36.0 TO 36.9 IN ADULT (HCC): ICD-10-CM

## 2025-08-19 DIAGNOSIS — E78.2 MIXED HYPERLIPIDEMIA: ICD-10-CM

## 2025-08-19 DIAGNOSIS — I10 PRIMARY HYPERTENSION: ICD-10-CM

## 2025-08-19 PROCEDURE — 99214 OFFICE O/P EST MOD 30 MIN: CPT | Performed by: NURSE PRACTITIONER

## 2025-08-19 PROCEDURE — 3074F SYST BP LT 130 MM HG: CPT | Performed by: NURSE PRACTITIONER

## 2025-08-19 PROCEDURE — 3044F HG A1C LEVEL LT 7.0%: CPT | Performed by: NURSE PRACTITIONER

## 2025-08-19 PROCEDURE — 3078F DIAST BP <80 MM HG: CPT | Performed by: NURSE PRACTITIONER

## 2025-08-19 RX ORDER — SPIRONOLACTONE 50 MG/1
50 TABLET, FILM COATED ORAL DAILY
Qty: 90 TABLET | Refills: 1 | Status: SHIPPED | OUTPATIENT
Start: 2025-08-19

## 2025-08-19 RX ORDER — LOSARTAN POTASSIUM 100 MG/1
100 TABLET ORAL DAILY
Qty: 90 TABLET | Refills: 1 | Status: SHIPPED | OUTPATIENT
Start: 2025-08-19

## 2025-08-19 RX ORDER — AMLODIPINE BESYLATE 10 MG/1
10 TABLET ORAL DAILY
Qty: 90 TABLET | Refills: 1 | Status: SHIPPED | OUTPATIENT
Start: 2025-08-19

## 2025-08-19 RX ORDER — ATORVASTATIN CALCIUM 40 MG/1
40 TABLET, FILM COATED ORAL DAILY
Qty: 90 TABLET | Refills: 3 | Status: SHIPPED | OUTPATIENT
Start: 2025-08-19

## 2025-08-20 ENCOUNTER — HOSPITAL ENCOUNTER (OUTPATIENT)
Facility: HOSPITAL | Age: 65
Setting detail: SPECIMEN
Discharge: HOME | End: 2025-08-23
Payer: COMMERCIAL

## 2025-08-20 DIAGNOSIS — E83.52 HYPERCALCEMIA: ICD-10-CM

## 2025-08-20 LAB
25(OH)D3 SERPL-MCNC: 25 NG/ML (ref 30–100)
CALCIUM SERPL-MCNC: 9.9 MG/DL (ref 8.5–10.1)
PTH-INTACT SERPL-MCNC: 27.9 PG/ML (ref 15–65)

## 2025-08-20 PROCEDURE — 83970 ASSAY OF PARATHORMONE: CPT

## 2025-08-20 PROCEDURE — 82306 VITAMIN D 25 HYDROXY: CPT

## 2025-08-20 PROCEDURE — 82330 ASSAY OF CALCIUM: CPT

## 2025-08-20 PROCEDURE — 84165 PROTEIN E-PHORESIS SERUM: CPT

## 2025-08-20 PROCEDURE — 84155 ASSAY OF PROTEIN SERUM: CPT

## 2025-08-22 LAB
ALBUMIN SERPL ELPH-MCNC: 4.1 G/DL (ref 2.9–4.4)
ALBUMIN/GLOB SERPL: 1.5 (ref 0.7–1.7)
ALPHA1 GLOB SERPL ELPH-MCNC: 0.2 G/DL (ref 0–0.4)
ALPHA2 GLOB SERPL ELPH-MCNC: 0.7 G/DL (ref 0.4–1)
B-GLOBULIN SERPL ELPH-MCNC: 1 G/DL (ref 0.7–1.3)
CA-I SERPL ISE-MCNC: 5.1 MG/DL (ref 4.5–5.6)
GAMMA GLOB SERPL ELPH-MCNC: 0.8 G/DL (ref 0.4–1.8)
GLOBULIN SER CALC-MCNC: 2.7 G/DL (ref 2.2–3.9)
M PROTEIN SERPL ELPH-MCNC: NORMAL G/DL
PROT SERPL-MCNC: 6.8 G/DL (ref 6–8.5)

## (undated) DEVICE — INTENDED FOR TISSUE SEPARATION, AND OTHER PROCEDURES THAT REQUIRE A SHARP SURGICAL BLADE TO PUNCTURE OR CUT.: Brand: BARD-PARKER ® CARBON RIB-BACK BLADES

## (undated) DEVICE — SYR 10ML CTRL LR LCK NSAF LF --

## (undated) DEVICE — Device

## (undated) DEVICE — KERLIX BANDAGE ROLL: Brand: KERLIX

## (undated) DEVICE — SUTURE VCRL SZ 3-0 L27IN ABSRB UD L26MM SH 1/2 CIR J416H

## (undated) DEVICE — SUTURE MCRYL SZ 4-0 L18IN ABSRB UD P-3 L13MM 3/8 CIR PRIM Y494G

## (undated) DEVICE — SUTURE PROL SZ 6-0 L18IN NONABSORBABLE BLU P-3 L13MM 3/8 8695G

## (undated) DEVICE — SOLUTION IRRIG 1000ML H2O STRL BLT

## (undated) DEVICE — 1.5L THIN WALL CAN: Brand: CRD

## (undated) DEVICE — DEPAUL MAJOR HEAD AND NECK: Brand: MEDLINE INDUSTRIES, INC.

## (undated) DEVICE — SOLUTION IV 1000ML 0.9% SOD CHL

## (undated) DEVICE — SOLUTION SCRB 4OZ 10% PVP I POVIDONE IOD TOP PAINT EXIDINE

## (undated) DEVICE — SKIN MARKER,REGULAR TIP WITH RULER AND LABELS: Brand: DEVON

## (undated) DEVICE — BIPOLAR FORCEPS CORD,BANANA LEADS: Brand: VALLEYLAB

## (undated) DEVICE — SUTURE PROL SZ 4-0 L18IN NONABSORBABLE BLU L13MM P-3 3/8 8699G

## (undated) DEVICE — SMARTGOWN SURGICAL GOWN, 2XL: Brand: CONVERTORS